# Patient Record
Sex: FEMALE | Race: ASIAN | NOT HISPANIC OR LATINO | Employment: FULL TIME | URBAN - METROPOLITAN AREA
[De-identification: names, ages, dates, MRNs, and addresses within clinical notes are randomized per-mention and may not be internally consistent; named-entity substitution may affect disease eponyms.]

---

## 2017-03-03 ENCOUNTER — OFFICE VISIT (OUTPATIENT)
Dept: FAMILY MEDICINE | Facility: CLINIC | Age: 47
End: 2017-03-03
Payer: COMMERCIAL

## 2017-03-03 VITALS
RESPIRATION RATE: 16 BRPM | WEIGHT: 132 LBS | TEMPERATURE: 98 F | HEIGHT: 60 IN | SYSTOLIC BLOOD PRESSURE: 128 MMHG | DIASTOLIC BLOOD PRESSURE: 82 MMHG | HEART RATE: 76 BPM | BODY MASS INDEX: 25.91 KG/M2

## 2017-03-03 DIAGNOSIS — R07.0 THROAT PAIN: Primary | ICD-10-CM

## 2017-03-03 LAB
DEPRECATED S PYO AG THROAT QL EIA: NORMAL
MICRO REPORT STATUS: NORMAL
SPECIMEN SOURCE: NORMAL

## 2017-03-03 PROCEDURE — 87880 STREP A ASSAY W/OPTIC: CPT | Performed by: PHYSICIAN ASSISTANT

## 2017-03-03 PROCEDURE — 87081 CULTURE SCREEN ONLY: CPT | Performed by: PHYSICIAN ASSISTANT

## 2017-03-03 PROCEDURE — 99213 OFFICE O/P EST LOW 20 MIN: CPT | Performed by: PHYSICIAN ASSISTANT

## 2017-03-03 NOTE — PATIENT INSTRUCTIONS
Self-Care for Sore Throats  Sore throats occur for many reasons, such as colds, allergies, and infections caused by viruses or bacteria. In any case, your throat becomes red and sore. Your goal for self-care is to reduce your discomfort while giving your throat a chance to heal.    Moisten and Soothe Your Throat    Try a sip of water first thing after waking up.    Keep your throat moist by drinking 6 or more glasses of clear liquids every day.    Run a cool-air humidifier in your room overnight.    Avoid cigarette smoke.     Suck on throat lozenges, cough drops, hard candy, ice chips, or frozen fruit-juice bars. Use the sugar-free versions if your diet or medical condition require them.  Gargle to Ease Irritation  Gargling every hour or 2 can ease irritation. Try gargling with 1 of these solutions:    1/4 teaspoon of salt in 1/2 cup of warm water    An over-the-counter anesthetic gargle  Use Medication for More Relief  Over-the-counter medication can reduce sore throat symptoms. Ask your pharmacist if you have questions about which medication to use:    Ease pain with anesthetic sprays. Aspirin or an aspirin substitute also helps. Remember, never give aspirin to anyone 18 or younger, or if you are already taking blood thinners.     For sore throats caused by allergies, try antihistamines to block the allergic reaction.    Remember: unless a sore throat is caused by a bacterial infection, antibiotics won t help you.  Prevent Future Sore Throats    Stop smoking or reduce contact with secondhand smoke. Smoke irritates the tender throat lining.    Limit contact with pets and with allergy-causing substances, such as pollen and mold.    When you re around someone with a sore throat or cold, wash your hands frequently to keep viruses or bacteria from spreading.    Don t strain your vocal cords.  Call Your Health Care Provider  Contact your doctor if you have:    A temperature over 101 F (38.3 C)    White spots on the  throat    Great difficulty swallowing    Trouble breathing    A skin rash    Recent exposure to someone else with strep bacteria    Severe hoarseness and swollen glands in the neck or jaw     4841-0695 [employer]. [address] [place] [address], [place] [address]. All rights reserved. This information is not intended as a substitute for professional medical care. Always follow your healthcare professional's instructions.

## 2017-03-03 NOTE — PROGRESS NOTES
SUBJECTIVE:                                                    Chad Smith is a 46 year old female who presents to clinic today for the following health issues:      Acute Illness   Acute illness concerns: sore throat  Onset: Monday pm    Fever: did not take    Chills/Sweats: YES    Headache (location?): YES    Sinus Pressure:YES    Conjunctivitis:  no    Ear Pain: YES: both    Rhinorrhea: YES    Congestion: YES    Sore Throat: YES     Cough: YES-productive of yellow sputum    Wheeze: no     Decreased Appetite: YES    Nausea: no     Vomiting: no    Diarrhea:  no    Dysuria/Freq.: no    Fatigue/Achiness: YES    Sick/Strep Exposure: son     Therapies Tried and outcome: tylenol, sudafed      Problem list and histories reviewed & adjusted, as indicated.  Additional history: as documented      Reviewed and updated as needed this visit by clinical staff  Tobacco  Allergies  Meds  Problems  Med Hx  Surg Hx  Fam Hx  Soc Hx        Reviewed and updated as needed this visit by Provider  Allergies  Meds  Problems         ROS:  Constitutional, HEENT, cardiovascular, pulmonary, gi and gu systems are negative, except as otherwise noted.    OBJECTIVE:                                                    /82 (BP Location: Right arm, Patient Position: Chair, Cuff Size: Adult Regular)  Pulse 76  Temp 98  F (36.7  C) (Oral)  Resp 16  Ht 5' (1.524 m)  Wt 132 lb (59.9 kg)  BMI 25.78 kg/m2  Body mass index is 25.78 kg/(m^2).  GENERAL: alert and no distress  EYES: Eyes grossly normal to inspection, PERRL and conjunctivae and sclerae normal  HENT: normal cephalic/atraumatic, both ears: clear effusion, nasal mucosa edematous , oral mucous membranes moist, oropharynx with mild erythema and injection, no tonsillar hypertrophy, no tonsillar exudate and sinuses: frontal tenderness on bilaterally  NECK: no adenopathy, no asymmetry, masses, or scars and thyroid normal to palpation  RESP: lungs clear to auscultation - no  rales, rhonchi or wheezes  CV: regular rate and rhythm, normal S1 S2, no S3 or S4, no murmur, click or rub  ABDOMEN: soft, nontender, no hepatosplenomegaly, no masses and bowel sounds normal  MS: no gross musculoskeletal defects noted, no edema    Diagnostic Test Results:  Results for orders placed or performed in visit on 03/03/17 (from the past 24 hour(s))   Rapid strep screen   Result Value Ref Range    Specimen Description Throat     Rapid Strep A Screen       NEGATIVE: No Group A streptococcal antigen detected by immunoassay, await   culture report.      Micro Report Status FINAL 03/03/2017         ASSESSMENT/PLAN:                                                      (R07.0) Throat pain  (primary encounter diagnosis)  Comment: rapid strep negative. Likely viral. Supportive care measures discussed. See patient instructions. If symptoms fail to improve in 1 week, patient should RTC. Sooner if worsening.   Plan: Rapid strep screen, Beta strep group A culture              Follow up: as above     Richard Lowe PA-C  [facility]

## 2017-03-03 NOTE — MR AVS SNAPSHOT
After Visit Summary   3/3/2017    Chad Smith    MRN: [id number]           Patient Information     Date Of Birth          1970        Visit Information        Provider Department      3/3/2017 9:45 AM Richard Lowe PA-C [facility]        Today's Diagnoses     Throat pain    -  1      Care Instructions      Self-Care for Sore Throats  Sore throats occur for many reasons, such as colds, allergies, and infections caused by viruses or bacteria. In any case, your throat becomes red and sore. Your goal for self-care is to reduce your discomfort while giving your throat a chance to heal.    Moisten and Soothe Your Throat    Try a sip of water first thing after waking up.    Keep your throat moist by drinking 6 or more glasses of clear liquids every day.    Run a cool-air humidifier in your room overnight.    Avoid cigarette smoke.     Suck on throat lozenges, cough drops, hard candy, ice chips, or frozen fruit-juice bars. Use the sugar-free versions if your diet or medical condition require them.  Gargle to Ease Irritation  Gargling every hour or 2 can ease irritation. Try gargling with 1 of these solutions:    1/4 teaspoon of salt in 1/2 cup of warm water    An over-the-counter anesthetic gargle  Use Medication for More Relief  Over-the-counter medication can reduce sore throat symptoms. Ask your pharmacist if you have questions about which medication to use:    Ease pain with anesthetic sprays. Aspirin or an aspirin substitute also helps. Remember, never give aspirin to anyone 18 or younger, or if you are already taking blood thinners.     For sore throats caused by allergies, try antihistamines to block the allergic reaction.    Remember: unless a sore throat is caused by a bacterial infection, antibiotics won t help you.  Prevent Future Sore Throats    Stop smoking or reduce contact with secondhand smoke. Smoke irritates the tender throat lining.    Limit  contact with pets and with allergy-causing substances, such as pollen and mold.    When you re around someone with a sore throat or cold, wash your hands frequently to keep viruses or bacteria from spreading.    Don t strain your vocal cords.  Call Your Health Care Provider  Contact your doctor if you have:    A temperature over 101 F (38.3 C)    White spots on the throat    Great difficulty swallowing    Trouble breathing    A skin rash    Recent exposure to someone else with strep bacteria    Severe hoarseness and swollen glands in the neck or jaw     2619-6936 [employer]. [address] [place] [address]. All rights reserved. This information is not intended as a substitute for professional medical care. Always follow your healthcare professional's instructions.              Follow-ups after your visit        Who to contact     If you have questions or need follow up information about today's clinic visit or your schedule please contact [facility] directly at [phone].  Normal or non-critical lab and imaging results will be communicated to you by [employer]TAJhart, letter or phone within 4 business days after the clinic has received the results. If you do not hear from us within 7 days, please contact the clinic through [employer]TAJhart or phone. If you have a critical or abnormal lab result, we will notify you by phone as soon as possible.  Submit refill requests through [employer] or call your pharmacy and they will forward the refill request to us. Please allow 3 business days for your refill to be completed.          Additional Information About Your Visit        St[employer]um Information     [employer] gives you secure access to your electronic health record. If you see a primary care provider, you can also send messages to your care team and make appointments. If you have questions, please call your primary care clinic.  If you do not have a primary care provider, please call [phone]  and they will assist you.        Care EveryWhere ID     This is your Care EveryWhere ID. This could be used by other organizations to access your [facility] medical records  [id number]        Your Vitals Were     Pulse Temperature Respirations Height BMI (Body Mass Index)       76 98  F (36.7  C) (Oral) 16 5' (1.524 m) 25.78 kg/m2        Blood Pressure from Last 3 Encounters:   03/03/17 128/82   11/04/14 102/70   02/04/14 100/60    Weight from Last 3 Encounters:   03/03/17 132 lb (59.9 kg)   11/04/14 122 lb (55.3 kg)   02/04/14 120 lb (54.4 kg)              We Performed the Following     Rapid strep screen        Primary Care Provider Office Phone # Fax #    Jaskaran Lennox Paul Balgobin, [phone]       [facility] [address] [facility] [address]        Thank you!     Thank you for choosing [facility]  for your care. Our goal is always to provide you with excellent care. Hearing back from our patients is one way we can continue to improve our services. Please take a few minutes to complete the written survey that you may receive in the mail after your visit with us. Thank you!             Your Updated Medication List - Protect others around you: Learn how to safely use, store and throw away your medicines at www.disposemymeds.org.      Notice  As of 3/3/2017 10:13 AM    You have not been prescribed any medications.

## 2017-03-03 NOTE — NURSING NOTE
Chief Complaint   Patient presents with     Pharyngitis     /82 (BP Location: Right arm, Patient Position: Chair, Cuff Size: Adult Regular)  Pulse 76  Temp 98  F (36.7  C) (Oral)  Resp 16  Ht 5' (1.524 m)  Wt 132 lb (59.9 kg)  BMI 25.78 kg/m2 Estimated body mass index is 25.78 kg/(m^2) as calculated from the following:    Height as of this encounter: 5' (1.524 m).    Weight as of this encounter: 132 lb (59.9 kg).  BP completed using cuff size: regular       Health Maintenance addressed:  Pap Smear    Pt will schedule physical/pap appt.    ZIYAD Sands

## 2017-03-05 LAB
BACTERIA SPEC CULT: NORMAL
MICRO REPORT STATUS: NORMAL
SPECIMEN SOURCE: NORMAL

## 2017-03-27 ENCOUNTER — OFFICE VISIT (OUTPATIENT)
Dept: FAMILY MEDICINE | Facility: CLINIC | Age: 47
End: 2017-03-27
Payer: COMMERCIAL

## 2017-03-27 VITALS
HEART RATE: 92 BPM | WEIGHT: 134 LBS | RESPIRATION RATE: 20 BRPM | BODY MASS INDEX: 26.17 KG/M2 | DIASTOLIC BLOOD PRESSURE: 78 MMHG | OXYGEN SATURATION: 97 % | TEMPERATURE: 97.3 F | SYSTOLIC BLOOD PRESSURE: 113 MMHG

## 2017-03-27 DIAGNOSIS — J20.9 ACUTE BRONCHITIS, UNSPECIFIED ORGANISM: Primary | ICD-10-CM

## 2017-03-27 PROCEDURE — 99213 OFFICE O/P EST LOW 20 MIN: CPT | Performed by: FAMILY MEDICINE

## 2017-03-27 RX ORDER — PREDNISONE 20 MG/1
20 TABLET ORAL DAILY
Qty: 5 TABLET | Refills: 0 | Status: SHIPPED | OUTPATIENT
Start: 2017-03-27 | End: 2017-04-11

## 2017-03-27 RX ORDER — BENZONATATE 100 MG/1
100 CAPSULE ORAL 3 TIMES DAILY PRN
Qty: 42 CAPSULE | Refills: 0 | Status: SHIPPED | OUTPATIENT
Start: 2017-03-27 | End: 2017-04-11

## 2017-03-27 NOTE — NURSING NOTE
Chief Complaint   Patient presents with     URI     uri symptoms x4 weeks, c/o cough and chest congestion, nasal congestion       Initial /78 (BP Location: Right arm, Patient Position: Chair, Cuff Size: Adult Regular)  Pulse 92  Temp 97.3  F (36.3  C) (Oral)  Resp 20  Wt 134 lb (60.8 kg)  SpO2 97%  Breastfeeding? No  BMI 26.17 kg/m2 Estimated body mass index is 26.17 kg/(m^2) as calculated from the following:    Height as of 3/3/17: 5' (1.524 m).    Weight as of this encounter: 134 lb (60.8 kg).  Medication Reconciliation: complete     Octavia Toussaint/[facility]

## 2017-03-27 NOTE — PROGRESS NOTES
SUBJECTIVE:                                                    Chad Smith is a 46 year old female who presents to clinic today for the following health issues:      Acute Illness   Acute illness concerns: cough and chest congestion  Onset: x4 weeks    Fever: YES    Chills/Sweats: YES    Headache (location?): YES    Sinus Pressure:YES    Conjunctivitis:  YES-     Ear Pain: YES: bilateral    Rhinorrhea: YES    Congestion: YES    Sore Throat: YES    PND: YES     Cough: YES    Wheeze: YES    Shortness of breath: no    Decreased Appetite: YES    Nausea: no    Vomiting: no    Diarrhea:  no    Dysuria/Freq.: no    Fatigue/Achiness: YES    Sick/Strep Exposure: YES     Therapies Tried and outcome: ibuprofen , OTC cough meds - no relief         Problem list and histories reviewed & adjusted, as indicated.  Additional history: as documented    Patient Active Problem List   Diagnosis     Encounter for other general counseling or advice on contraception     CARDIOVASCULAR SCREENING; LDL GOAL LESS THAN 160     Common wart     Past Surgical History:   Procedure Laterality Date     C  DELIVERY ONLY      x2       Social History   Substance Use Topics     Smoking status: Never Smoker     Smokeless tobacco: Never Used     Alcohol use No     Family History   Problem Relation Age of Onset     Hypertension Father      Genitourinary Problems Father      kidney disease     CEREBROVASCULAR DISEASE Father      Respiratory Father      Sleep apnea     DIABETES Maternal Grandfather      type 2           Reviewed and updated as needed this visit by clinical staff  Tobacco  Allergies       Reviewed and updated as needed this visit by Provider         ROS:  Constitutional, HEENT, pulmonary systems are negative, except as otherwise noted.    OBJECTIVE:                                                    /78 (BP Location: Right arm, Patient Position: Chair, Cuff Size: Adult Regular)  Pulse 92  Temp 97.3  F (36.3  C)  (Oral)  Resp 20  Wt 134 lb (60.8 kg)  SpO2 97%  Breastfeeding? No  BMI 26.17 kg/m2  Body mass index is 26.17 kg/(m^2).  GENERAL: healthy, alert and no distress  HENT: ear canals and TM's normal, nose and mouth without ulcers or lesions  NECK: no adenopathy, no asymmetry, masses, or scars and thyroid normal to palpation  RESP: significant cough during exam, mild expiratory wheezing, no acute respiratory distress noted.   CV: regular rate and rhythm, normal S1 S2, no S3 or S4, no murmur,   Diagnostic Test Results:  none      ASSESSMENT/PLAN:                                                        1. Acute bronchitis, unspecified organism  - Discussed etiology and natural course of bronchitis and that most do not improve with antibiotics. Symptomatic management for bronchitis -acetaminophen or ibuprofen for pain or fever, tessalon perles for cough. Return if worsening or develops fever.  - benzonatate (TESSALON) 100 MG capsule; Take 1 capsule (100 mg) by mouth 3 times daily as needed  Dispense: 42 capsule; Refill: 0  - predniSONE (DELTASONE) 20 MG tablet; Take 1 tablet (20 mg) by mouth daily  Dispense: 5 tablet; Refill: 0    See Patient Instructions    Priscilla Shetty MD  [facility]

## 2017-03-27 NOTE — PATIENT INSTRUCTIONS
Flonase use daily  Humidifier   Take prednisone in the mornings x 5 days   Tessalon perles up to 3 times a day as needed for cough   Follow up as needed  Bronchitis, Viral (Adult)    You have a viral bronchitis. Bronchitis is inflammation and swelling of the lining of the lungs. This is often caused by an infection. Symptoms include a dry, hacking cough that is worse at night. The cough may bring up yellow-green mucus. You may also feel short of breath or wheeze. Other symptoms may include tiredness, chest discomfort, and chills.  Bronchitis that is caused by a virus is not treated with antibiotics. Instead, medicines may be given to help relieve symptoms. Symptoms can last up to 2 weeks, although the cough may last much longer.  This illness is contagious during the first few days and is spread through the air by coughing and sneezing, or by direct contact (touching the sick person and then touching your own eyes, nose, or mouth).  Most viral illnesses resolve within 10 to 14 days with rest and simple home remedies, although they may sometimes last for several weeks.  Home care    If symptoms are severe, rest at home for the first 2 to 3 days. When you go back to your usual activities, don't let yourself get too tired.    Do not smoke. Also avoid being exposed to secondhand smoke.    You may use over-the-counter medicine to control fever or pain, unless another pain medicine was prescribed. (Note: If you have chronic liver or kidney disease or have ever had a stomach ulcer or gastrointestinal bleeding, talk with your healthcare provider before using these medicines. Also talk to your provider if you are taking medicine to prevent blood clots.) Aspirin should never be given to anyone younger than 18 years of age who is ill with a viral infection or fever. It may cause severe liver or brain damage.    Your appetite may be poor, so a light diet is fine. Avoid dehydration by drinking 6 to 8 glasses of fluids per day  (such as water, soft drinks, sports drinks, juices, tea, or soup). Extra fluids will help loosen secretions in the nose and lungs.    Over-the-counter cough, cold, and sore-throat medicines will not shorten the length of the illness, but they may help to reduce symptoms. (Note: Do not use decongestants if you have high blood pressure.)  Follow-up care  Follow up with your healthcare provider, or as advised.  If you had an X-ray or ECG (electrocardiogram), a specialist will review it. You will be notified of any new findings that may affect your care.  Note: If you are age 65 or older, or if you have a chronic lung disease or condition that affects your immune system, or you smoke, talk to your healthcare provider about having pneumococcal vaccinations and a yearly influenza vaccination (flu shot).  When to seek medical advice   Call your healthcare provider right away if any of these occur:    Fever of 100.4 F (38 C) or higher    Coughing up increased amounts of colored sputum    Weakness, drowsiness, headache, facial pain, ear pain, or a stiff neck  Call 911, or get immediate medical care  Contact emergency services right away if any of these occur:    Coughing up blood    Worsening weakness, drowsiness, headache, or stiff neck    Trouble breathing, wheezing, or pain with breathing    9251-5173 [employer]. [address] [place] [address], [place] [address]. All rights reserved. This information is not intended as a substitute for professional medical care. Always follow your healthcare professional's instructions.

## 2017-03-27 NOTE — MR AVS SNAPSHOT
After Visit Summary   3/27/2017    Chad Smith    MRN: [id number]           Patient Information     Date Of Birth          1970        Visit Information        Provider Department      3/27/2017 3:00 PM Priscilla Shetty MD [facility]        Today's Diagnoses     Acute bronchitis, unspecified organism    -  1      Care Instructions      Flonase use daily  Humidifier   Take prednisone in the mornings x 5 days   Tessalon perles up to 3 times a day as needed for cough   Follow up as needed  Bronchitis, Viral (Adult)    You have a viral bronchitis. Bronchitis is inflammation and swelling of the lining of the lungs. This is often caused by an infection. Symptoms include a dry, hacking cough that is worse at night. The cough may bring up yellow-green mucus. You may also feel short of breath or wheeze. Other symptoms may include tiredness, chest discomfort, and chills.  Bronchitis that is caused by a virus is not treated with antibiotics. Instead, medicines may be given to help relieve symptoms. Symptoms can last up to 2 weeks, although the cough may last much longer.  This illness is contagious during the first few days and is spread through the air by coughing and sneezing, or by direct contact (touching the sick person and then touching your own eyes, nose, or mouth).  Most viral illnesses resolve within 10 to 14 days with rest and simple home remedies, although they may sometimes last for several weeks.  Home care    If symptoms are severe, rest at home for the first 2 to 3 days. When you go back to your usual activities, don't let yourself get too tired.    Do not smoke. Also avoid being exposed to secondhand smoke.    You may use over-the-counter medicine to control fever or pain, unless another pain medicine was prescribed. (Note: If you have chronic liver or kidney disease or have ever had a stomach ulcer or gastrointestinal bleeding, talk with your  healthcare provider before using these medicines. Also talk to your provider if you are taking medicine to prevent blood clots.) Aspirin should never be given to anyone younger than 18 years of age who is ill with a viral infection or fever. It may cause severe liver or brain damage.    Your appetite may be poor, so a light diet is fine. Avoid dehydration by drinking 6 to 8 glasses of fluids per day (such as water, soft drinks, sports drinks, juices, tea, or soup). Extra fluids will help loosen secretions in the nose and lungs.    Over-the-counter cough, cold, and sore-throat medicines will not shorten the length of the illness, but they may help to reduce symptoms. (Note: Do not use decongestants if you have high blood pressure.)  Follow-up care  Follow up with your healthcare provider, or as advised.  If you had an X-ray or ECG (electrocardiogram), a specialist will review it. You will be notified of any new findings that may affect your care.  Note: If you are age 65 or older, or if you have a chronic lung disease or condition that affects your immune system, or you smoke, talk to your healthcare provider about having pneumococcal vaccinations and a yearly influenza vaccination (flu shot).  When to seek medical advice   Call your healthcare provider right away if any of these occur:    Fever of 100.4 F (38 C) or higher    Coughing up increased amounts of colored sputum    Weakness, drowsiness, headache, facial pain, ear pain, or a stiff neck  Call 911, or get immediate medical care  Contact emergency services right away if any of these occur:    Coughing up blood    Worsening weakness, drowsiness, headache, or stiff neck    Trouble breathing, wheezing, or pain with breathing    [id number] [employer]. [address] [place] [address]. All rights reserved. This information is not intended as a substitute for professional medical care. Always follow your healthcare professional's  instructions.              Follow-ups after your visit        Who to contact     If you have questions or need follow up information about today's clinic visit or your schedule please contact [facility] directly at [phone].  Normal or non-critical lab and imaging results will be communicated to you by MyChart, letter or phone within 4 business days after the clinic has received the results. If you do not hear from us within 7 days, please contact the clinic through [employer] or phone. If you have a critical or abnormal lab result, we will notify you by phone as soon as possible.  Submit refill requests through [employer] or call your pharmacy and they will forward the refill request to us. Please allow 3 business days for your refill to be completed.          Additional Information About Your Visit        [employer] Information     [employer] gives you secure access to your electronic health record. If you see a primary care provider, you can also send messages to your care team and make appointments. If you have questions, please call your primary care clinic.  If you do not have a primary care provider, please call [phone] and they will assist you.        Care EveryWhere ID     This is your Care EveryWhere ID. This could be used by other organizations to access your [facility] medical records  [id number]        Your Vitals Were     Pulse Temperature Respirations Pulse Oximetry Breastfeeding? BMI (Body Mass Index)    92 97.3  F (36.3  C) (Oral) 20 97% No 26.17 kg/m2       Blood Pressure from Last 3 Encounters:   03/27/17 113/78   03/03/17 128/82   11/04/14 102/70    Weight from Last 3 Encounters:   03/27/17 134 lb (60.8 kg)   03/03/17 132 lb (59.9 kg)   11/04/14 122 lb (55.3 kg)              Today, you had the following     No orders found for display         Today's Medication Changes          These changes are accurate as of: 3/27/17  3:27 PM.  If you have any questions, ask your nurse or  doctor.               Start taking these medicines.        Dose/Directions    benzonatate 100 MG capsule   Commonly known as:  TESSALON   Used for:  Acute bronchitis, unspecified organism   Started by:  Priscilla Shetty MD        Dose:  100 mg   Take 1 capsule (100 mg) by mouth 3 times daily as needed   Quantity:  42 capsule   Refills:  0       predniSONE 20 MG tablet   Commonly known as:  DELTASONE   Used for:  Acute bronchitis, unspecified organism   Started by:  Priscilla Shetty MD        Dose:  20 mg   Take 1 tablet (20 mg) by mouth daily   Quantity:  5 tablet   Refills:  0            Where to get your medicines      These medications were sent to [facility] [address]  [address] [facility] [address]     Phone:  [phone]     benzonatate 100 MG capsule    predniSONE 20 MG tablet                Primary Care Provider Office Phone # Fax #    Jaskaran Lennox Paul Balgobin, [phone]       [facility] [address] [facility] [address]        Thank you!     Thank you for choosing [facility]  for your care. Our goal is always to provide you with excellent care. Hearing back from our patients is one way we can continue to improve our services. Please take a few minutes to complete the written survey that you may receive in the mail after your visit with us. Thank you!             Your Updated Medication List - Protect others around you: Learn how to safely use, store and throw away your medicines at www.disposemymeds.org.          This list is accurate as of: 3/27/17  3:27 PM.  Always use your most recent med list.                   Brand Name Dispense Instructions for use    benzonatate 100 MG capsule    TESSALON    42 capsule    Take 1 capsule (100 mg) by mouth 3 times daily as needed       predniSONE 20 MG tablet    DELTASONE    5 tablet    Take 1 tablet (20 mg) by  mouth daily

## 2017-04-06 ENCOUNTER — TELEPHONE (OUTPATIENT)
Dept: FAMILY MEDICINE | Facility: CLINIC | Age: 47
End: 2017-04-06

## 2017-04-06 NOTE — TELEPHONE ENCOUNTER
Panel Management Review      Patient has the following on her problem list: None      Composite cancer screening  Chart review shows that this patient is due/due soon for the following Pap Smear  Summary:    Patient is due/failing the following:   PAP    Action needed:   Patient needs office visit for physical wit hpap.    Type of outreach:    Phone, left message for patient to call back.     Questions for provider review:    None                                                                                                                                    ZIYAD Sansd      Chart routed to Care Team .

## 2017-04-11 ENCOUNTER — OFFICE VISIT (OUTPATIENT)
Dept: FAMILY MEDICINE | Facility: CLINIC | Age: 47
End: 2017-04-11
Payer: COMMERCIAL

## 2017-04-11 VITALS
HEART RATE: 68 BPM | DIASTOLIC BLOOD PRESSURE: 80 MMHG | HEIGHT: 60 IN | BODY MASS INDEX: 26.31 KG/M2 | WEIGHT: 134 LBS | OXYGEN SATURATION: 96 % | TEMPERATURE: 98.1 F | SYSTOLIC BLOOD PRESSURE: 112 MMHG | RESPIRATION RATE: 10 BRPM

## 2017-04-11 DIAGNOSIS — Z00.00 ROUTINE GENERAL MEDICAL EXAMINATION AT A HEALTH CARE FACILITY: Primary | ICD-10-CM

## 2017-04-11 DIAGNOSIS — Z97.5 IUD (INTRAUTERINE DEVICE) IN PLACE: ICD-10-CM

## 2017-04-11 LAB
BASOPHILS # BLD AUTO: 0 10E9/L (ref 0–0.2)
BASOPHILS NFR BLD AUTO: 0.3 %
DIFFERENTIAL METHOD BLD: ABNORMAL
EOSINOPHIL # BLD AUTO: 0.2 10E9/L (ref 0–0.7)
EOSINOPHIL NFR BLD AUTO: 1.5 %
ERYTHROCYTE [DISTWIDTH] IN BLOOD BY AUTOMATED COUNT: 12.5 % (ref 10–15)
HCT VFR BLD AUTO: 42.4 % (ref 35–47)
HGB BLD-MCNC: 14.1 G/DL (ref 11.7–15.7)
LYMPHOCYTES # BLD AUTO: 2.8 10E9/L (ref 0.8–5.3)
LYMPHOCYTES NFR BLD AUTO: 21.8 %
MCH RBC QN AUTO: 30.5 PG (ref 26.5–33)
MCHC RBC AUTO-ENTMCNC: 33.3 G/DL (ref 31.5–36.5)
MCV RBC AUTO: 92 FL (ref 78–100)
MONOCYTES # BLD AUTO: 1.3 10E9/L (ref 0–1.3)
MONOCYTES NFR BLD AUTO: 10.1 %
NEUTROPHILS # BLD AUTO: 8.4 10E9/L (ref 1.6–8.3)
NEUTROPHILS NFR BLD AUTO: 66.3 %
PLATELET # BLD AUTO: 287 10E9/L (ref 150–450)
RBC # BLD AUTO: 4.63 10E12/L (ref 3.8–5.2)
WBC # BLD AUTO: 12.7 10E9/L (ref 4–11)

## 2017-04-11 PROCEDURE — 87624 HPV HI-RISK TYP POOLED RSLT: CPT | Performed by: NURSE PRACTITIONER

## 2017-04-11 PROCEDURE — G0145 SCR C/V CYTO,THINLAYER,RESCR: HCPCS | Performed by: NURSE PRACTITIONER

## 2017-04-11 PROCEDURE — 36415 COLL VENOUS BLD VENIPUNCTURE: CPT | Performed by: NURSE PRACTITIONER

## 2017-04-11 PROCEDURE — 80053 COMPREHEN METABOLIC PANEL: CPT | Performed by: NURSE PRACTITIONER

## 2017-04-11 PROCEDURE — 80061 LIPID PANEL: CPT | Performed by: NURSE PRACTITIONER

## 2017-04-11 PROCEDURE — 99396 PREV VISIT EST AGE 40-64: CPT | Performed by: NURSE PRACTITIONER

## 2017-04-11 PROCEDURE — 85025 COMPLETE CBC W/AUTO DIFF WBC: CPT | Performed by: NURSE PRACTITIONER

## 2017-04-11 NOTE — MR AVS SNAPSHOT
After Visit Summary   4/11/2017    Chad Smith    MRN: [id number]           Patient Information     Date Of Birth          1970        Visit Information        Provider Department      4/11/2017 1:00 PM Haase, Susan Rachele, APRN [facility]        Today's Diagnoses     Routine general medical examination at a health care facility    -  1      Care Instructions      Preventive Health Recommendations  Female Ages 40 to 49    Yearly exam:     See your health care provider every year in order to  1. Review health changes.   2. Discuss preventive care.    3. Review your medicines if your doctor prescribed any.      Get a Pap test every three years (unless you have an abnormal result and your provider advises testing more often).      If you get Pap tests with HPV test, you only need to test every 5 years, unless you have an abnormal result. You do not need a Pap test if your uterus was removed (hysterectomy) and you have not had cancer.      You should be tested each year for STDs (sexually transmitted diseases), if you're at risk.       Ask your doctor if you should have a mammogram.      Have a colonoscopy (test for colon cancer) if someone in your family has had colon cancer or polyps before age 50.       Have a cholesterol test every 5 years.       Have a diabetes test (fasting glucose) after age 45. If you are at risk for diabetes, you should have this test every 3 years.    Shots: Get a flu shot each year. Get a tetanus shot every 10 years.     Nutrition:     Eat at least 5 servings of fruits and vegetables each day.    Eat whole-grain bread, whole-wheat pasta and brown rice instead of white grains and rice.    Talk to your provider about Calcium and Vitamin D.     Lifestyle    Exercise at least 150 minutes a week (an average of 30 minutes a day, 5 days a week). This will help you control your weight and prevent disease.    Limit alcohol to one drink per  day.    No smoking.     Wear sunscreen to prevent skin cancer.    See your dentist every six months for an exam and cleaning.        Follow-ups after your visit        Follow-up notes from your care team     Return in about 1 year (around 4/11/2018) for Physical Exam.      Who to contact     If you have questions or need follow up information about today's clinic visit or your schedule please contact [facility] directly at [phone].  Normal or non-critical lab and imaging results will be communicated to you by [employer], letter or phone within 4 business days after the clinic has received the results. If you do not hear from us within 7 days, please contact the clinic through [employer] or phone. If you have a critical or abnormal lab result, we will notify you by phone as soon as possible.  Submit refill requests through [employer] or call your pharmacy and they will forward the refill request to us. Please allow 3 business days for your refill to be completed.          Additional Information About Your Visit        [employer] Information     [employer] gives you secure access to your electronic health record. If you see a primary care provider, you can also send messages to your care team and make appointments. If you have questions, please call your primary care clinic.  If you do not have a primary care provider, please call [phone] and they will assist you.        Care EveryWhere ID     This is your Care EveryWhere ID. This could be used by other organizations to access your [facility] medical records  [id number]        Your Vitals Were     Pulse Temperature Respirations Height Pulse Oximetry BMI (Body Mass Index)    68 98.1  F (36.7  C) (Oral) 10 5' (1.524 m) 96% 26.17 kg/m2       Blood Pressure from Last 3 Encounters:   04/11/17 112/80   03/27/17 113/78   03/03/17 128/82    Weight from Last 3 Encounters:   04/11/17 134 lb (60.8 kg)   03/27/17 134 lb (60.8 kg)   03/03/17 132 lb (59.9 kg)               We Performed the Following     CBC with platelets differential     Comprehensive metabolic panel     HPV High Risk Types DNA Cervical     Lipid panel reflex to direct LDL     Pap imaged thin layer screen with HPV - recommended age 30 - 65 years (select HPV order below)        Primary Care Provider    None Specified       No primary provider on file.        Thank you!     Thank you for choosing [facility]  for your care. Our goal is always to provide you with excellent care. Hearing back from our patients is one way we can continue to improve our services. Please take a few minutes to complete the written survey that you may receive in the mail after your visit with us. Thank you!             Your Updated Medication List - Protect others around you: Learn how to safely use, store and throw away your medicines at www.disposemymeds.org.      Notice  As of 4/11/2017  1:30 PM    You have not been prescribed any medications.

## 2017-04-11 NOTE — PROGRESS NOTES
SUBJECTIVE:     CC: Chad Smith is an 46 year old woman who presents for preventive health visit.     Healthy Habits:    Do you get at least three servings of calcium containing foods daily (dairy, green leafy vegetables, etc.)? yes    Amount of exercise or daily activities, outside of work: 3-4 day(s) per week    Problems taking medications regularly No    Medication side effects: No    Have you had an eye exam in the past two years? yes    Do you see a dentist twice per year? Yes    Do you have sleep apnea, excessive snoring or daytime drowsiness?no    Health Maintenance -- Seen on 3/27 for bronchitis. Feeling better, cough almost completely resolved.   Menses:  Has Mirena IUD. No periods since IUD placed in 12/2013. Normally exercising 3-4 days/week. Running on treadmill. Has not been to the gym since sick but starting to resume exercise now that feeling better. Denies abdominal pain, constipation, diarrhea, or vaginal discharge. No FHx of breast cancer.      Mammogram -- Not appropriate based on age, baseline screening completed in 2014, normal    Pap Smear -- last 5/2013, recommended every 3 years - OVERDUE - will complete today   Colonoscopy -- Not appropriate based on age    Today's PHQ-2 Score:   PHQ-2 ( 1999 [employer]) 4/11/2017 5/21/2013   Q1: Little interest or pleasure in doing things 0 0   Q2: Feeling down, depressed or hopeless 0 0   PHQ-2 Score 0 0     Abuse: Current or Past(Physical, Sexual or Emotional)- No  Do you feel safe in your environment - Yes    Social History   Substance Use Topics     Smoking status: Never Smoker     Smokeless tobacco: Never Used     Alcohol use No     The patient does not drink >3 drinks per day nor >7 drinks per week.    Recent Labs   Lab Test  05/21/13   1126   CHOL  154   HDL  45*   LDL  96   TRIG  64   CHOLHDLRATIO  3.4     Reviewed orders with patient.  Reviewed health maintenance and updated orders accordingly - Yes    Mammo Decision Support:  Mammogram  not appropriate for this patient based on age. Screening completed in 2014, normal.    Pertinent mammograms are reviewed under the imaging tab.  History of abnormal Pap smear: NO - age 30- 65 PAP every 3 years recommended    Reviewed and updated as needed this visit by clinical staff  Tobacco  Meds  Med Hx  Surg Hx  Fam Hx  Soc Hx      Reviewed and updated as needed this visit by Provider    Past Medical History:   Diagnosis Date     IUD (intrauterine device) in place 12/13     NO ACTIVE PROBLEMS       ROS:  Constitutional, HEENT, cardiovascular, pulmonary, GI, , musculoskeletal, neuro, skin, endocrine and psych systems are negative, except as in HPI or otherwise noted     This document serves as a record of the services and decisions personally performed and made by Susan Haase, CNP. It was created on her behalf by Neyda Arreguin, a trained medical scribe. The creation of this document is based the provider's statements to the medical scribe.  Neyda Arreguin April 11, 2017 1:15 PM     Problem list, Medication list, Allergies, and Medical/Social/Surgical histories reviewed in [facility] and updated as appropriate.  OBJECTIVE:     /80 (BP Location: Left arm, Patient Position: Chair, Cuff Size: Adult Regular)  Pulse 68  Temp 98.1  F (36.7  C) (Oral)  Resp 10  Ht 1.524 m (5')  Wt 60.8 kg (134 lb)  SpO2 96%  BMI 26.17 kg/m2  EXAM:  GENERAL: healthy, alert and no distress  HENT: ear canals and TM's normal, nose and mouth without ulcers or lesions  NECK: no adenopathy, no asymmetry, masses, or scars and thyroid normal to palpation  RESP: lungs clear to auscultation - no rales, rhonchi or wheezes  BREAST: normal without masses, tenderness or nipple discharge and no palpable axillary masses or adenopathy  CV: regular rate and rhythm, normal S1 S2, no S3 or S4, no murmur, click or rub, no peripheral edema  ABDOMEN: soft, nontender, no hepatosplenomegaly, no masses and bowel sounds normal   (female):  normal female external genitalia, normal urethral meatus, vaginal mucosa pink, moist, well rugated, and normal cervix/adnexa/uterus without masses or discharge, IUD strings visible in center of cervix  MS: no gross musculoskeletal defects noted, no edema  SKIN: no suspicious lesions or rashes  NEURO: Normal strength and tone, mentation intact and speech normal  PSYCH: mentation appears normal, affect normal/bright    ASSESSMENT/PLAN:     Chad was seen today for physical.    Diagnoses and all orders for this visit:    Routine general medical examination at a health care facility  -     CBC with platelets differential  -     Comprehensive metabolic panel  -     Lipid panel reflex to direct LDL  -     Pap imaged thin layer screen with HPV - recommended age 30 - 65 years (select HPV order below)  -     HPV High Risk Types DNA Cervical    Other orders  -     Cancel: GLUCOSE          COUNSELING:   Reviewed preventive health counseling, as reflected in patient instructions     reports that she has never smoked. She has never used smokeless tobacco.    Estimated body mass index is 26.17 kg/(m^2) as calculated from the following:    Height as of this encounter: 1.524 m (5').    Weight as of this encounter: 60.8 kg (134 lb).     Counseling Resources:  ATP IV Guidelines  Pooled Cohorts Equation Calculator  Breast Cancer Risk Calculator  FRAX Risk Assessment  ICSI Preventive Guidelines  Dietary Guidelines for Americans, 2010  USDA's MyPlate  ASA Prophylaxis  Lung CA Screening    The information in this document, created by the medical scribe for me, accurately reflects the services I personally performed and the decisions made by me. I have reviewed and approved this document for accuracy.   Susan Haase, CNP   Follow up in 1 year, sooner as needed.  Susan Haase, APRN CNP  [facility]

## 2017-04-11 NOTE — NURSING NOTE
Chief Complaint   Patient presents with     Physical       Initial /80 (BP Location: Left arm, Patient Position: Chair, Cuff Size: Adult Regular)  Pulse 68  Temp 98.1  F (36.7  C) (Oral)  Resp 10  Ht 5' (1.524 m)  Wt 134 lb (60.8 kg)  SpO2 96%  BMI 26.17 kg/m2 Estimated body mass index is 26.17 kg/(m^2) as calculated from the following:    Height as of this encounter: 5' (1.524 m).    Weight as of this encounter: 134 lb (60.8 kg).  Medication Reconciliation: complete   Samanta Wilson, CMA

## 2017-04-12 LAB
ALBUMIN SERPL-MCNC: 3.6 G/DL (ref 3.4–5)
ALP SERPL-CCNC: 62 U/L (ref 40–150)
ALT SERPL W P-5'-P-CCNC: 32 U/L (ref 0–50)
ANION GAP SERPL CALCULATED.3IONS-SCNC: 8 MMOL/L (ref 3–14)
AST SERPL W P-5'-P-CCNC: 16 U/L (ref 0–45)
BILIRUB SERPL-MCNC: 0.3 MG/DL (ref 0.2–1.3)
BUN SERPL-MCNC: 9 MG/DL (ref 7–30)
CALCIUM SERPL-MCNC: 8.6 MG/DL (ref 8.5–10.1)
CHLORIDE SERPL-SCNC: 108 MMOL/L (ref 94–109)
CHOLEST SERPL-MCNC: 171 MG/DL
CO2 SERPL-SCNC: 26 MMOL/L (ref 20–32)
CREAT SERPL-MCNC: 0.64 MG/DL (ref 0.52–1.04)
GFR SERPL CREATININE-BSD FRML MDRD: NORMAL ML/MIN/1.7M2
GLUCOSE SERPL-MCNC: 88 MG/DL (ref 70–99)
HDLC SERPL-MCNC: 42 MG/DL
LDLC SERPL CALC-MCNC: 99 MG/DL
NONHDLC SERPL-MCNC: 129 MG/DL
POTASSIUM SERPL-SCNC: 4.2 MMOL/L (ref 3.4–5.3)
PROT SERPL-MCNC: 7.5 G/DL (ref 6.8–8.8)
SODIUM SERPL-SCNC: 142 MMOL/L (ref 133–144)
TRIGL SERPL-MCNC: 148 MG/DL

## 2017-04-12 NOTE — PROGRESS NOTES
Vega Bonilla,  Your lab results are as below:  1)  Cholesterol was normal at 171, your LDL (bad cholesterol) and your HDL (good cholesterol) were also within normal range.   2)  Glucose was normal at 88 (normal fasting is <100).    If you have any questions do not hesitate to call the clinic to discuss the results with me further.     Sincerely,    Susan Haase, CNP

## 2017-04-12 NOTE — PROGRESS NOTES
Hi Gregory,  Your white count is slightly elevated at 12.7, this is likely due to your recent infection.    Sincerely,     Suan Haase, CNP

## 2017-04-13 LAB
COPATH REPORT: NORMAL
PAP: NORMAL

## 2017-04-17 LAB
FINAL DIAGNOSIS: NORMAL
HPV HR 12 DNA CVX QL NAA+PROBE: NEGATIVE
HPV16 DNA SPEC QL NAA+PROBE: NEGATIVE
HPV18 DNA SPEC QL NAA+PROBE: NEGATIVE
SPECIMEN DESCRIPTION: NORMAL

## 2018-10-31 ENCOUNTER — OFFICE VISIT (OUTPATIENT)
Dept: FAMILY MEDICINE | Facility: CLINIC | Age: 48
End: 2018-10-31
Payer: COMMERCIAL

## 2018-10-31 VITALS
OXYGEN SATURATION: 95 % | SYSTOLIC BLOOD PRESSURE: 118 MMHG | DIASTOLIC BLOOD PRESSURE: 78 MMHG | HEIGHT: 61 IN | HEART RATE: 67 BPM | BODY MASS INDEX: 26.28 KG/M2 | WEIGHT: 139.2 LBS | TEMPERATURE: 98.6 F

## 2018-10-31 DIAGNOSIS — Z23 NEED FOR PROPHYLACTIC VACCINATION AND INOCULATION AGAINST INFLUENZA: ICD-10-CM

## 2018-10-31 DIAGNOSIS — Z30.433 ENCOUNTER FOR IUD REMOVAL AND REINSERTION: Primary | ICD-10-CM

## 2018-10-31 PROCEDURE — 90686 IIV4 VACC NO PRSV 0.5 ML IM: CPT | Performed by: FAMILY MEDICINE

## 2018-10-31 PROCEDURE — 58300 INSERT INTRAUTERINE DEVICE: CPT | Performed by: FAMILY MEDICINE

## 2018-10-31 PROCEDURE — 58301 REMOVE INTRAUTERINE DEVICE: CPT | Performed by: FAMILY MEDICINE

## 2018-10-31 PROCEDURE — 99213 OFFICE O/P EST LOW 20 MIN: CPT | Mod: 25 | Performed by: FAMILY MEDICINE

## 2018-10-31 PROCEDURE — 90471 IMMUNIZATION ADMIN: CPT | Performed by: FAMILY MEDICINE

## 2018-10-31 NOTE — PROGRESS NOTES
Injectable Influenza Immunization Documentation    1.  Is the person to be vaccinated sick today?   No    2. Does the person to be vaccinated have an allergy to a component   of the vaccine?   No  Egg Allergy Algorithm Link    3. Has the person to be vaccinated ever had a serious reaction   to influenza vaccine in the past?   No    4. Has the person to be vaccinated ever had Guillain-Barré syndrome?   No    Form completed by Micaela House CMA on 10/31/2018 at 2:51 PM          CALLED CT. HAS THE PT.  WILL CALL BACK

## 2018-10-31 NOTE — MR AVS SNAPSHOT
"              After Visit Summary   10/31/2018    Chad Smith    MRN: [id number]           Patient Information     Date Of Birth          1970        Visit Information        Provider Department      10/31/2018 1:30 PM Shazia Mckenna MD [facility]        Today's Diagnoses     Encounter for IUD removal and reinsertion    -  1       Follow-ups after your visit        Who to contact     If you have questions or need follow up information about today's clinic visit or your schedule please contact [facility] directly at [phone].  Normal or non-critical lab and imaging results will be communicated to you by [employer], letter or phone within 4 business days after the clinic has received the results. If you do not hear from us within 7 days, please contact the clinic through [employer] or phone. If you have a critical or abnormal lab result, we will notify you by phone as soon as possible.  Submit refill requests through [employer] or call your pharmacy and they will forward the refill request to us. Please allow 3 business days for your refill to be completed.          Additional Information About Your Visit        MyChart Information     [employer] gives you secure access to your electronic health record. If you see a primary care provider, you can also send messages to your care team and make appointments. If you have questions, please call your primary care clinic.  If you do not have a primary care provider, please call [phone] and they will assist you.        Care EveryWhere ID     This is your Care EveryWhere ID. This could be used by other organizations to access your [place] medical records  [id number]        Your Vitals Were     Pulse Temperature Height Pulse Oximetry Breastfeeding? BMI (Body Mass Index)    67 98.6  F (37  C) (Oral) 5' 1\" (1.549 m) 95% No 26.3 kg/m2       Blood Pressure from Last 3 Encounters:   10/31/18 118/78   04/11/17 112/80   03/27/17 " 113/78    Weight from Last 3 Encounters:   10/31/18 139 lb 3.2 oz (63.1 kg)   04/11/17 134 lb (60.8 kg)   03/27/17 134 lb (60.8 kg)              We Performed the Following     INSERTION INTRAUTERINE DEVICE     REMOVE INTRAUTERINE DEVICE          Today's Medication Changes          These changes are accurate as of 10/31/18  2:35 PM.  If you have any questions, ask your nurse or doctor.               Start taking these medicines.        Dose/Directions    levonorgestrel 20 MCG/24HR IUD   Commonly known as:  MIRENA   Used for:  Encounter for IUD removal and reinsertion   Started by:  Shazia Mckenna MD        Dose:  1 each   1 each (20 mcg) by Intrauterine route once for 1 dose   Quantity:  1 each   Refills:  0            Where to get your medicines      Some of these will need a paper prescription and others can be bought over the counter.  Ask your nurse if you have questions.     You don't need a prescription for these medications     levonorgestrel 20 MCG/24HR IUD                Primary Care Provider Office Phone # Fax #    Shazia Mckenna [phone] [id number] [facility] [id number]        Equal Access to Services     [facility] AH: Hadii serenity lr hadasho Sojt, waaxda luqadaha, qaybta kaalmada adeegyada, tanisha ortiz . So [facility] [phone].    ATENCIÓN: Si habla español, tiene a de la rosa disposición servicios gratuitos de asistencia lingüística. Llame al [phone].    We comply with applicable federal civil rights laws and [place] laws. We do not discriminate on the basis of race, color, national origin, age, disability, sex, sexual orientation, or gender identity.            Thank you!     Thank you for choosing [facility]  for your care. Our goal is always to provide you with excellent care. Hearing back from our patients is one way we can continue to improve our services. Please take a few minutes to complete the written survey that you  may receive in the mail after your visit with us. Thank you!             Your Updated Medication List - Protect others around you: Learn how to safely use, store and throw away your medicines at www.disposemymeds.org.          This list is accurate as of 10/31/18  2:35 PM.  Always use your most recent med list.                   Brand Name Dispense Instructions for use Diagnosis    levonorgestrel 20 MCG/24HR IUD    MIRENA    1 each    1 each (20 mcg) by Intrauterine route once for 1 dose    Encounter for IUD removal and reinsertion

## 2018-10-31 NOTE — PROGRESS NOTES
"  SUBJECTIVE:   [place] Shailesh Smith is a 47 year old female who presents to clinic today for the following health issues:      Pt is here for IUD replacement.   She had this 5 years ago and it was her second one.   She would like another today.   Discussed the copper IUD vs the mirena.   She would like to continue to go with minimal periods.       Past Medical History:   Diagnosis Date     IUD (intrauterine device) in place        Past Surgical History:   Procedure Laterality Date     C  DELIVERY ONLY  ,2008    x2       MEDICATIONS:  No current outpatient prescriptions on file.     No current facility-administered medications for this visit.        SOCIAL HISTORY:  Social History   Substance Use Topics     Smoking status: Never Smoker     Smokeless tobacco: Never Used     Alcohol use No       Family History   Problem Relation Age of Onset     Hypertension Father      Genitourinary Problems Father      kidney disease     Cerebrovascular Disease Father      Respiratory Father      Sleep apnea     Diabetes Maternal Grandfather      type 2       Objective:  Blood pressure 118/78, pulse 67, temperature 98.6  F (37  C), temperature source Oral, height 5' 1\" (1.549 m), weight 139 lb 3.2 oz (63.1 kg), SpO2 95 %, not currently breastfeeding.      Pelvic: reveals normal sized uterus which is antiverted - there is no tenderness and no adnexal masses felt  The cervix is nulliiparous  Sterile technique used and vagina and cervix cleansed with betadine  After the tenaculum was placed the uterine sound was inserted to 6 cm with no complications - a Mirena IUD was placed and the string cut at 3cm  The patient tolerated the procedure well.  A dilator was used with no problems and minimal pressure needed  Some trouble initially with IUD applicator slipping away from external os repeatedly    Assessment:  IUD removal and replacement    Plan:   The patient was sent home with information on the Mirena and told to " check for the string after each menses  If she experiences symptoms of a vaginal infection, she should be seen  The Mirena needs to be replaced after 5 years  Flu shot today

## 2019-01-03 ENCOUNTER — OFFICE VISIT (OUTPATIENT)
Dept: FAMILY MEDICINE | Facility: CLINIC | Age: 49
End: 2019-01-03
Payer: COMMERCIAL

## 2019-01-03 VITALS
SYSTOLIC BLOOD PRESSURE: 120 MMHG | HEIGHT: 61 IN | OXYGEN SATURATION: 97 % | WEIGHT: 143 LBS | TEMPERATURE: 97.9 F | RESPIRATION RATE: 14 BRPM | DIASTOLIC BLOOD PRESSURE: 68 MMHG | HEART RATE: 72 BPM | BODY MASS INDEX: 27 KG/M2

## 2019-01-03 DIAGNOSIS — L98.9 LESION OF SKIN OF FOOT: ICD-10-CM

## 2019-01-03 DIAGNOSIS — J40 BRONCHITIS: Primary | ICD-10-CM

## 2019-01-03 PROCEDURE — 99213 OFFICE O/P EST LOW 20 MIN: CPT | Performed by: NURSE PRACTITIONER

## 2019-01-03 RX ORDER — BENZONATATE 200 MG/1
200 CAPSULE ORAL 3 TIMES DAILY PRN
Qty: 30 CAPSULE | Refills: 0 | Status: SHIPPED | OUTPATIENT
Start: 2019-01-03 | End: 2019-01-10

## 2019-01-03 RX ORDER — PREDNISONE 20 MG/1
20 TABLET ORAL DAILY
Qty: 5 TABLET | Refills: 0 | Status: SHIPPED | OUTPATIENT
Start: 2019-01-03 | End: 2019-01-08

## 2019-01-03 ASSESSMENT — MIFFLIN-ST. JEOR: SCORE: 1226.02

## 2019-01-03 NOTE — PROGRESS NOTES
SUBJECTIVE:   Chad Smith is a 48 year old female who presents to clinic today for the following health issues:    RESPIRATORY SYMPTOMS      Duration: 1.5 weeks    Description  cough, headache and myalgias    Severity: mild    Accompanying signs and symptoms: None    History (predisposing factors):  none    Precipitating or alleviating factors: None    Therapies tried and outcome:  oral decongestant  Itchy throat.  Increased cough at night.  Cough is productive of sputum that is yellow in color. Denies chest pain, shortness of breath, fever, nasal congestion.      Wart:  Present on side of right foot for a year, has tried OTC compound W without relief.  Is an avid runner, increased pain when done running.     Problem list and histories reviewed & adjusted, as indicated.  Additional history: as documented    Patient Active Problem List   Diagnosis     CARDIOVASCULAR SCREENING; LDL GOAL LESS THAN 160     IUD (intrauterine device) in place     Past Surgical History:   Procedure Laterality Date     C  DELIVERY ONLY  ,2008    x2       Social History     Tobacco Use     Smoking status: Never Smoker     Smokeless tobacco: Never Used   Substance Use Topics     Alcohol use: No     Family History   Problem Relation Age of Onset     Hypertension Father      Genitourinary Problems Father         kidney disease     Cerebrovascular Disease Father      Respiratory Father         Sleep apnea     Diabetes Maternal Grandfather         type 2         Current Outpatient Medications   Medication Sig Dispense Refill     benzonatate (TESSALON) 200 MG capsule Take 1 capsule (200 mg) by mouth 3 times daily as needed for cough 30 capsule 0     predniSONE (DELTASONE) 20 MG tablet Take 20 mg by mouth daily for 5 days. 5 tablet 0     levonorgestrel (MIRENA) 20 MCG/24HR IUD 1 each (20 mcg) by Intrauterine route once for 1 dose 1 each 0     Allergies   Allergen Reactions     Ciprofloxacin        Reviewed and updated as  "needed this visit by clinical staff       Reviewed and updated as needed this visit by Provider         ROS:  CONSTITUTIONAL: NEGATIVE for fever, chills, change in weight  INTEGUMENTARY/SKIN: see HPI  ENT/MOUTH: NEGATIVE for ear, mouth  Problems.See HPI  RESP: see HPI  CV: NEGATIVE for chest pain, palpitations or peripheral edema  PSYCHIATRIC: NEGATIVE for changes in mood or affect    OBJECTIVE:     /68 (BP Location: Right arm, Patient Position: Chair, Cuff Size: Adult Regular)   Pulse 72   Temp 97.9  F (36.6  C) (Oral)   Resp 14   Ht 1.549 m (5' 1\")   Wt 64.9 kg (143 lb)   SpO2 97%   BMI 27.02 kg/m    Body mass index is 27.02 kg/m .   GENERAL: healthy, alert and no distress  HENT: ear canals and TM's normal, nose and mouth without ulcers or lesions  NECK: no adenopathy, no asymmetry, masses, or scars and thyroid normal to palpation  RESP: lungs clear to auscultation - no rales, rhonchi or wheezes  CV: regular rate and rhythm, normal S1 S2, no S3 or S4, no murmur, click or rub, no peripheral edema  SKIN: lateral aspect of right foot with a 0.5 cm raised papule,bleeds easily.  PSYCH: mentation appears normal, affect normal/bright    ASSESSMENT:   See below    PLAN:   Chad was seen today for uri.    Diagnoses and all orders for this visit:    Bronchitis:  Will prescribe prednisone for 5 days, start in am, also may take tessalon tid prn.  Encouraged rest, increase fluid intake.  -     benzonatate (TESSALON) 200 MG capsule; Take 1 capsule (200 mg) by mouth 3 times daily as needed for cough  -     predniSONE (DELTASONE) 20 MG tablet; Take 20 mg by mouth daily for 5 days.    Lesion of skin of foot: unclear if this is a wart, bleeds easily, did apply cryo, if does not resolve would recommend seeing dermatology.  -     DERMATOLOGY REFERRAL      Follow up in 4/2018 for routine wellness exam, sooner as needed.      Susan Haase, APRN [facility]"

## 2019-11-05 ENCOUNTER — HEALTH MAINTENANCE LETTER (OUTPATIENT)
Age: 49
End: 2019-11-05

## 2020-07-02 ASSESSMENT — ENCOUNTER SYMPTOMS
CHILLS: 0
NERVOUS/ANXIOUS: 0
COUGH: 0
NAUSEA: 0
SORE THROAT: 0
CONSTIPATION: 0
HEARTBURN: 0
HEADACHES: 0
PARESTHESIAS: 0
ABDOMINAL PAIN: 0
FEVER: 0
DIZZINESS: 0
BREAST MASS: 0
JOINT SWELLING: 0
ARTHRALGIAS: 0
FREQUENCY: 0
DIARRHEA: 0
HEMATOCHEZIA: 0
MYALGIAS: 0
EYE PAIN: 0
WEAKNESS: 0
HEMATURIA: 0
PALPITATIONS: 0
SHORTNESS OF BREATH: 0
DYSURIA: 0

## 2020-07-06 ENCOUNTER — OFFICE VISIT (OUTPATIENT)
Dept: FAMILY MEDICINE | Facility: CLINIC | Age: 50
End: 2020-07-06
Payer: COMMERCIAL

## 2020-07-06 VITALS
WEIGHT: 152 LBS | SYSTOLIC BLOOD PRESSURE: 124 MMHG | DIASTOLIC BLOOD PRESSURE: 80 MMHG | HEART RATE: 70 BPM | BODY MASS INDEX: 28.72 KG/M2 | OXYGEN SATURATION: 99 % | TEMPERATURE: 98.6 F

## 2020-07-06 DIAGNOSIS — Z13.6 CARDIOVASCULAR SCREENING; LDL GOAL LESS THAN 160: ICD-10-CM

## 2020-07-06 DIAGNOSIS — Z00.00 ROUTINE GENERAL MEDICAL EXAMINATION AT A HEALTH CARE FACILITY: Primary | ICD-10-CM

## 2020-07-06 DIAGNOSIS — Z97.5 IUD (INTRAUTERINE DEVICE) IN PLACE: ICD-10-CM

## 2020-07-06 LAB
BASOPHILS # BLD AUTO: 0.1 10E9/L (ref 0–0.2)
BASOPHILS NFR BLD AUTO: 0.6 %
DIFFERENTIAL METHOD BLD: NORMAL
EOSINOPHIL # BLD AUTO: 0.3 10E9/L (ref 0–0.7)
EOSINOPHIL NFR BLD AUTO: 2.5 %
ERYTHROCYTE [DISTWIDTH] IN BLOOD BY AUTOMATED COUNT: 12.5 % (ref 10–15)
HCT VFR BLD AUTO: 42.1 % (ref 35–47)
HGB BLD-MCNC: 14.3 G/DL (ref 11.7–15.7)
LYMPHOCYTES # BLD AUTO: 2.5 10E9/L (ref 0.8–5.3)
LYMPHOCYTES NFR BLD AUTO: 24.5 %
MCH RBC QN AUTO: 30.4 PG (ref 26.5–33)
MCHC RBC AUTO-ENTMCNC: 34 G/DL (ref 31.5–36.5)
MCV RBC AUTO: 90 FL (ref 78–100)
MONOCYTES # BLD AUTO: 1.1 10E9/L (ref 0–1.3)
MONOCYTES NFR BLD AUTO: 10.7 %
NEUTROPHILS # BLD AUTO: 6.2 10E9/L (ref 1.6–8.3)
NEUTROPHILS NFR BLD AUTO: 61.7 %
PLATELET # BLD AUTO: 306 10E9/L (ref 150–450)
RBC # BLD AUTO: 4.7 10E12/L (ref 3.8–5.2)
WBC # BLD AUTO: 10.1 10E9/L (ref 4–11)

## 2020-07-06 PROCEDURE — G0145 SCR C/V CYTO,THINLAYER,RESCR: HCPCS | Performed by: NURSE PRACTITIONER

## 2020-07-06 PROCEDURE — 80053 COMPREHEN METABOLIC PANEL: CPT | Performed by: NURSE PRACTITIONER

## 2020-07-06 PROCEDURE — 80061 LIPID PANEL: CPT | Performed by: NURSE PRACTITIONER

## 2020-07-06 PROCEDURE — 99396 PREV VISIT EST AGE 40-64: CPT | Performed by: NURSE PRACTITIONER

## 2020-07-06 PROCEDURE — 36415 COLL VENOUS BLD VENIPUNCTURE: CPT | Performed by: NURSE PRACTITIONER

## 2020-07-06 PROCEDURE — 87624 HPV HI-RISK TYP POOLED RSLT: CPT | Performed by: NURSE PRACTITIONER

## 2020-07-06 PROCEDURE — 85025 COMPLETE CBC W/AUTO DIFF WBC: CPT | Performed by: NURSE PRACTITIONER

## 2020-07-06 ASSESSMENT — ENCOUNTER SYMPTOMS
ARTHRALGIAS: 0
SHORTNESS OF BREATH: 0
WEAKNESS: 0
EYE PAIN: 0
HEMATURIA: 0
DIARRHEA: 0
DIZZINESS: 0
CHILLS: 0
BREAST MASS: 0
NERVOUS/ANXIOUS: 0
CONSTIPATION: 0
ABDOMINAL PAIN: 0
MYALGIAS: 0
SORE THROAT: 0
HEMATOCHEZIA: 0
HEADACHES: 0
FREQUENCY: 0
JOINT SWELLING: 0
DYSURIA: 0
PALPITATIONS: 0
HEARTBURN: 0
FEVER: 0
COUGH: 0
NAUSEA: 0
PARESTHESIAS: 0

## 2020-07-06 NOTE — RESULT ENCOUNTER NOTE
Hi [place],  Your CBC (checks for anemia and infection) is normal.  Sincerely,     Susan Haase, CNP

## 2020-07-06 NOTE — PROGRESS NOTES
SUBJECTIVE:   CC: Chad Smith is an 49 year old woman who presents for preventive health visit.     Healthy Habits:     Getting at least 3 servings of Calcium per day:  Yes    Bi-annual eye exam:  Yes    Dental care twice a year:  Yes    Sleep apnea or symptoms of sleep apnea:  None    Diet:  Regular (no restrictions)    Frequency of exercise:  2-3 days/week    Duration of exercise:  45-60 minutes    Taking medications regularly:  Yes    Medication side effects:  None    PHQ-2 Total Score: 0    Additional concerns today:  No  Social:  2 children ages 11 and 16.Works at home  Pap:  Last pap 2017, NIL  Mammogram:  Last in .   Sweat duct gland tumor:  Right foot, followed by dermatology.     Today's PHQ-2 Score:   PHQ-2 (  [employer]) 2020   Q1: Little interest or pleasure in doing things 0   Q2: Feeling down, depressed or hopeless 0   PHQ-2 Score 0   Q1: Little interest or pleasure in doing things Not at all   Q2: Feeling down, depressed or hopeless Not at all   PHQ-2 Score 0       Abuse: Current or Past(Physical, Sexual or Emotional)- No  Do you feel safe in your environment? Yes        Social History     Tobacco Use     Smoking status: Never Smoker     Smokeless tobacco: Never Used   Substance Use Topics     Alcohol use: No       Alcohol Use 2020   Prescreen: >3 drinks/day or >7 drinks/week? No   Prescreen: >3 drinks/day or >7 drinks/week? -       Reviewed orders with patient.  Reviewed health maintenance and updated orders accordingly - Yes  BP Readings from Last 3 Encounters:   20 124/80   19 120/68   10/31/18 118/78    Wt Readings from Last 3 Encounters:   20 68.9 kg (152 lb)   19 64.9 kg (143 lb)   10/31/18 63.1 kg (139 lb 3.2 oz)                  Patient Active Problem List   Diagnosis     CARDIOVASCULAR SCREENING; LDL GOAL LESS THAN 160     IUD (intrauterine device) in place     Past Surgical History:   Procedure Laterality Date     C  DELIVERY ONLY   2004,2008    x2       Social History     Tobacco Use     Smoking status: Never Smoker     Smokeless tobacco: Never Used   Substance Use Topics     Alcohol use: No     Family History   Problem Relation Age of Onset     Hypertension Father      Genitourinary Problems Father         kidney disease     Cerebrovascular Disease Father      Respiratory Father         Sleep apnea     Diabetes Maternal Grandfather         type 2         Current Outpatient Medications   Medication Sig Dispense Refill     levonorgestrel (MIRENA) 20 MCG/24HR IUD 1 each (20 mcg) by Intrauterine route once for 1 dose 1 each 0       Mammogram Screening: Patient under age 50, mutual decision reflected in health maintenance.      Pertinent mammograms are reviewed under the imaging tab.  History of abnormal Pap smear: NO - age 30-65 PAP every 5 years with negative HPV co-testing recommended  PAP / HPV Latest Ref Rng & Units 4/11/2017 5/21/2013 12/8/2008   PAP - NIL NIL NIL   HPV 16 DNA NEG Negative - -   HPV 18 DNA NEG Negative - -   OTHER HR HPV NEG Negative - -     Reviewed and updated as needed this visit by clinical staff         Reviewed and updated as needed this visit by Provider            Review of Systems   Constitutional: Negative for chills and fever.   HENT: Negative for congestion, ear pain, hearing loss and sore throat.    Eyes: Negative for pain and visual disturbance.   Respiratory: Negative for cough and shortness of breath.    Cardiovascular: Negative for chest pain, palpitations and peripheral edema.   Gastrointestinal: Negative for abdominal pain, constipation, diarrhea, heartburn, hematochezia and nausea.   Breasts:  Negative for tenderness, breast mass and discharge.   Genitourinary: Negative for dysuria, frequency, genital sores, hematuria, pelvic pain, urgency, vaginal bleeding and vaginal discharge.   Musculoskeletal: Negative for arthralgias, joint swelling and myalgias.   Skin: Negative for rash.   Neurological: Negative  for dizziness, weakness, headaches and paresthesias.   Psychiatric/Behavioral: Negative for mood changes. The patient is not nervous/anxious.       OBJECTIVE:   /80 (BP Location: Right arm, Patient Position: Chair, Cuff Size: Adult Regular)   Pulse 70   Temp 98.6  F (37  C) (Oral)   Wt 68.9 kg (152 lb)   SpO2 99%   BMI 28.72 kg/m    Physical Exam  GENERAL: healthy, alert and no distress  EYES: Eyes grossly normal to inspection, PERRL and conjunctivae and sclerae normal  HENT: ear canals and TM's normal, nose and mouth without ulcers or lesions  NECK: no adenopathy, no asymmetry, masses, or scars and thyroid normal to palpation  RESP: lungs clear to auscultation - no rales, rhonchi or wheezes  BREAST: normal without masses, tenderness or nipple discharge and no palpable axillary masses or adenopathy  CV: regular rate and rhythm, normal S1 S2, no S3 or S4, no murmur, click or rub, no peripheral edema and peripheral pulses strong  ABDOMEN: soft, nontender, no hepatosplenomegaly, no masses and bowel sounds normal   (female): normal female external genitalia, normal urethral meatus, vaginal mucosa pink, moist, well rugated, and normal cervix/adnexa/uterus without masses or discharge. IUD strings visualized.  MS: no gross musculoskeletal defects noted, no edema  SKIN: no suspicious lesions or rashes  NEURO: Normal strength and tone, mentation intact and speech normal  PSYCH: mentation appears normal, affect normal/bright        ASSESSMENT/PLAN:   Chad was seen today for physical.    Diagnoses and all orders for this visit:    Routine general medical examination at a health care facility  -     CBC with platelets differential  -     Pap imaged thin layer screen with HPV - recommended age 30 - 65 years (select HPV order below)  -     HPV High Risk Types DNA Cervical    IUD (intrauterine device) in place    CARDIOVASCULAR SCREENING; LDL GOAL LESS THAN 160  -     Comprehensive metabolic panel  -     Lipid panel  "reflex to direct LDL Fasting        COUNSELING:  Reviewed preventive health counseling, as reflected in patient instructions       Regular exercise       Healthy diet/nutrition    Estimated body mass index is 27.02 kg/m  as calculated from the following:    Height as of 1/3/19: 1.549 m (5' 1\").    Weight as of 1/3/19: 64.9 kg (143 lb).    Weight management plan: Discussed healthy diet and exercise guidelines     reports that she has never smoked. She has never used smokeless tobacco.      Counseling Resources:  ATP IV Guidelines  Pooled Cohorts Equation Calculator  Breast Cancer Risk Calculator  FRAX Risk Assessment  ICSI Preventive Guidelines  Dietary Guidelines for Americans, 2010  USDA's MyPlate  ASA Prophylaxis  Lung CA Screening  Follow up in 1 year, sooner as needed.   Susan Haase, APRN [facility]"

## 2020-07-07 LAB
ALBUMIN SERPL-MCNC: 3.4 G/DL (ref 3.4–5)
ALP SERPL-CCNC: 60 U/L (ref 40–150)
ALT SERPL W P-5'-P-CCNC: 36 U/L (ref 0–50)
ANION GAP SERPL CALCULATED.3IONS-SCNC: 4 MMOL/L (ref 3–14)
AST SERPL W P-5'-P-CCNC: 26 U/L (ref 0–45)
BILIRUB SERPL-MCNC: 0.3 MG/DL (ref 0.2–1.3)
BUN SERPL-MCNC: 9 MG/DL (ref 7–30)
CALCIUM SERPL-MCNC: 8.3 MG/DL (ref 8.5–10.1)
CHLORIDE SERPL-SCNC: 109 MMOL/L (ref 94–109)
CHOLEST SERPL-MCNC: 180 MG/DL
CO2 SERPL-SCNC: 25 MMOL/L (ref 20–32)
CREAT SERPL-MCNC: 0.64 MG/DL (ref 0.52–1.04)
GFR SERPL CREATININE-BSD FRML MDRD: >90 ML/MIN/{1.73_M2}
GLUCOSE SERPL-MCNC: 91 MG/DL (ref 70–99)
HDLC SERPL-MCNC: 39 MG/DL
LDLC SERPL CALC-MCNC: 111 MG/DL
NONHDLC SERPL-MCNC: 141 MG/DL
POTASSIUM SERPL-SCNC: 4.1 MMOL/L (ref 3.4–5.3)
PROT SERPL-MCNC: 7.6 G/DL (ref 6.8–8.8)
SODIUM SERPL-SCNC: 138 MMOL/L (ref 133–144)
TRIGL SERPL-MCNC: 149 MG/DL

## 2020-07-08 LAB
COPATH REPORT: NORMAL
PAP: NORMAL

## 2020-07-08 NOTE — RESULT ENCOUNTER NOTE
Vega Bonilla,  Your lab results are as below:  1)  Calcium is slightly low at 8.3.  2)  Cholesterol is normal at 180,  your LDL (bad cholesterol) is normal at 111, and your HDL (good cholesterol) is low at 39.  Continue to follow a low cholesterol diet and we will recheck this in 1 year.  3)  Glucose is normal at 91 (normal fasting is <100).      If you have any questions do not hesitate to call the clinic to discuss the results with me further.     Sincerely,    Susan Haase, CNP

## 2020-07-13 LAB
FINAL DIAGNOSIS: NORMAL
HPV HR 12 DNA CVX QL NAA+PROBE: NEGATIVE
HPV16 DNA SPEC QL NAA+PROBE: NEGATIVE
HPV18 DNA SPEC QL NAA+PROBE: NEGATIVE
SPECIMEN DESCRIPTION: NORMAL
SPECIMEN SOURCE CVX/VAG CYTO: NORMAL

## 2020-07-16 ENCOUNTER — ANCILLARY PROCEDURE (OUTPATIENT)
Dept: MAMMOGRAPHY | Facility: CLINIC | Age: 50
End: 2020-07-16
Attending: NURSE PRACTITIONER
Payer: COMMERCIAL

## 2020-07-16 DIAGNOSIS — Z12.31 VISIT FOR SCREENING MAMMOGRAM: ICD-10-CM

## 2020-07-16 PROCEDURE — 77063 BREAST TOMOSYNTHESIS BI: CPT | Mod: TC

## 2020-07-16 PROCEDURE — 77067 SCR MAMMO BI INCL CAD: CPT | Mod: TC

## 2020-11-22 ENCOUNTER — HEALTH MAINTENANCE LETTER (OUTPATIENT)
Age: 50
End: 2020-11-22

## 2021-04-02 ENCOUNTER — IMMUNIZATION (OUTPATIENT)
Dept: NURSING | Facility: CLINIC | Age: 51
End: 2021-04-02
Payer: COMMERCIAL

## 2021-04-02 PROCEDURE — 91300 PR COVID VAC PFIZER DIL RECON 30 MCG/0.3 ML IM: CPT

## 2021-04-02 PROCEDURE — 0001A PR COVID VAC PFIZER DIL RECON 30 MCG/0.3 ML IM: CPT

## 2021-04-23 ENCOUNTER — IMMUNIZATION (OUTPATIENT)
Dept: NURSING | Facility: CLINIC | Age: 51
End: 2021-04-23
Attending: INTERNAL MEDICINE
Payer: COMMERCIAL

## 2021-04-23 PROCEDURE — 0002A PR COVID VAC PFIZER DIL RECON 30 MCG/0.3 ML IM: CPT

## 2021-04-23 PROCEDURE — 91300 PR COVID VAC PFIZER DIL RECON 30 MCG/0.3 ML IM: CPT

## 2021-09-19 ENCOUNTER — HEALTH MAINTENANCE LETTER (OUTPATIENT)
Age: 51
End: 2021-09-19

## 2021-11-10 SDOH — ECONOMIC STABILITY: FOOD INSECURITY: WITHIN THE PAST 12 MONTHS, YOU WORRIED THAT YOUR FOOD WOULD RUN OUT BEFORE YOU GOT MONEY TO BUY MORE.: NEVER TRUE

## 2021-11-10 SDOH — ECONOMIC STABILITY: FOOD INSECURITY: WITHIN THE PAST 12 MONTHS, THE FOOD YOU BOUGHT JUST DIDN'T LAST AND YOU DIDN'T HAVE MONEY TO GET MORE.: NEVER TRUE

## 2021-11-10 SDOH — HEALTH STABILITY: PHYSICAL HEALTH: ON AVERAGE, HOW MANY MINUTES DO YOU ENGAGE IN EXERCISE AT THIS LEVEL?: 60 MIN

## 2021-11-10 SDOH — HEALTH STABILITY: PHYSICAL HEALTH: ON AVERAGE, HOW MANY DAYS PER WEEK DO YOU ENGAGE IN MODERATE TO STRENUOUS EXERCISE (LIKE A BRISK WALK)?: 6 DAYS

## 2021-11-10 SDOH — ECONOMIC STABILITY: TRANSPORTATION INSECURITY
IN THE PAST 12 MONTHS, HAS LACK OF TRANSPORTATION KEPT YOU FROM MEETINGS, WORK, OR FROM GETTING THINGS NEEDED FOR DAILY LIVING?: NO

## 2021-11-10 SDOH — ECONOMIC STABILITY: TRANSPORTATION INSECURITY
IN THE PAST 12 MONTHS, HAS THE LACK OF TRANSPORTATION KEPT YOU FROM MEDICAL APPOINTMENTS OR FROM GETTING MEDICATIONS?: NO

## 2021-11-10 SDOH — ECONOMIC STABILITY: INCOME INSECURITY: HOW HARD IS IT FOR YOU TO PAY FOR THE VERY BASICS LIKE FOOD, HOUSING, MEDICAL CARE, AND HEATING?: NOT HARD AT ALL

## 2021-11-10 SDOH — ECONOMIC STABILITY: INCOME INSECURITY: IN THE LAST 12 MONTHS, WAS THERE A TIME WHEN YOU WERE NOT ABLE TO PAY THE MORTGAGE OR RENT ON TIME?: NO

## 2021-11-10 ASSESSMENT — ENCOUNTER SYMPTOMS
CONSTIPATION: 0
HEMATURIA: 0
DYSURIA: 0
DIZZINESS: 0
ARTHRALGIAS: 0
PALPITATIONS: 0
NAUSEA: 0
DIARRHEA: 0
FEVER: 0
EYE PAIN: 0
MYALGIAS: 0
SHORTNESS OF BREATH: 0
HEMATOCHEZIA: 0
NERVOUS/ANXIOUS: 0
CHILLS: 0
HEADACHES: 0
FREQUENCY: 0
PARESTHESIAS: 0
JOINT SWELLING: 0
WEAKNESS: 0
COUGH: 0
ABDOMINAL PAIN: 0
SORE THROAT: 0
HEARTBURN: 0
BREAST MASS: 0

## 2021-11-10 ASSESSMENT — SOCIAL DETERMINANTS OF HEALTH (SDOH)
IN A TYPICAL WEEK, HOW MANY TIMES DO YOU TALK ON THE PHONE WITH FAMILY, FRIENDS, OR NEIGHBORS?: ONCE A WEEK
HOW OFTEN DO YOU ATTEND CHURCH OR RELIGIOUS SERVICES?: NEVER
HOW OFTEN DO YOU GET TOGETHER WITH FRIENDS OR RELATIVES?: ONCE A WEEK
DO YOU BELONG TO ANY CLUBS OR ORGANIZATIONS SUCH AS CHURCH GROUPS UNIONS, FRATERNAL OR ATHLETIC GROUPS, OR SCHOOL GROUPS?: YES

## 2021-11-10 ASSESSMENT — LIFESTYLE VARIABLES
HOW OFTEN DO YOU HAVE SIX OR MORE DRINKS ON ONE OCCASION: NEVER
HOW OFTEN DO YOU HAVE A DRINK CONTAINING ALCOHOL: MONTHLY OR LESS
HOW MANY STANDARD DRINKS CONTAINING ALCOHOL DO YOU HAVE ON A TYPICAL DAY: 1 OR 2

## 2021-11-11 ENCOUNTER — OFFICE VISIT (OUTPATIENT)
Dept: FAMILY MEDICINE | Facility: CLINIC | Age: 51
End: 2021-11-11
Payer: COMMERCIAL

## 2021-11-11 VITALS
HEIGHT: 61 IN | OXYGEN SATURATION: 97 % | WEIGHT: 138 LBS | SYSTOLIC BLOOD PRESSURE: 132 MMHG | HEART RATE: 57 BPM | TEMPERATURE: 98.1 F | DIASTOLIC BLOOD PRESSURE: 74 MMHG | BODY MASS INDEX: 26.06 KG/M2

## 2021-11-11 DIAGNOSIS — M77.11 LATERAL EPICONDYLITIS OF RIGHT ELBOW: ICD-10-CM

## 2021-11-11 DIAGNOSIS — Z00.00 ROUTINE GENERAL MEDICAL EXAMINATION AT A HEALTH CARE FACILITY: Primary | ICD-10-CM

## 2021-11-11 LAB
BASOPHILS # BLD AUTO: 0 10E3/UL (ref 0–0.2)
BASOPHILS NFR BLD AUTO: 0 %
EOSINOPHIL # BLD AUTO: 0.1 10E3/UL (ref 0–0.7)
EOSINOPHIL NFR BLD AUTO: 2 %
ERYTHROCYTE [DISTWIDTH] IN BLOOD BY AUTOMATED COUNT: 13.2 % (ref 10–15)
HCT VFR BLD AUTO: 43.8 % (ref 35–47)
HGB BLD-MCNC: 14.2 G/DL (ref 11.7–15.7)
LYMPHOCYTES # BLD AUTO: 2 10E3/UL (ref 0.8–5.3)
LYMPHOCYTES NFR BLD AUTO: 27 %
MCH RBC QN AUTO: 29.5 PG (ref 26.5–33)
MCHC RBC AUTO-ENTMCNC: 32.4 G/DL (ref 31.5–36.5)
MCV RBC AUTO: 91 FL (ref 78–100)
MONOCYTES # BLD AUTO: 0.8 10E3/UL (ref 0–1.3)
MONOCYTES NFR BLD AUTO: 11 %
NEUTROPHILS # BLD AUTO: 4.5 10E3/UL (ref 1.6–8.3)
NEUTROPHILS NFR BLD AUTO: 60 %
PLATELET # BLD AUTO: 303 10E3/UL (ref 150–450)
RBC # BLD AUTO: 4.81 10E6/UL (ref 3.8–5.2)
WBC # BLD AUTO: 7.5 10E3/UL (ref 4–11)

## 2021-11-11 PROCEDURE — 80061 LIPID PANEL: CPT | Performed by: NURSE PRACTITIONER

## 2021-11-11 PROCEDURE — 91300 COVID-19,PF,PFIZER (12+ YRS): CPT | Performed by: NURSE PRACTITIONER

## 2021-11-11 PROCEDURE — 99396 PREV VISIT EST AGE 40-64: CPT | Mod: 25 | Performed by: NURSE PRACTITIONER

## 2021-11-11 PROCEDURE — 85025 COMPLETE CBC W/AUTO DIFF WBC: CPT | Performed by: NURSE PRACTITIONER

## 2021-11-11 PROCEDURE — 0004A COVID-19,PF,PFIZER (12+ YRS): CPT | Performed by: NURSE PRACTITIONER

## 2021-11-11 PROCEDURE — 80053 COMPREHEN METABOLIC PANEL: CPT | Performed by: NURSE PRACTITIONER

## 2021-11-11 PROCEDURE — 36415 COLL VENOUS BLD VENIPUNCTURE: CPT | Performed by: NURSE PRACTITIONER

## 2021-11-11 ASSESSMENT — ENCOUNTER SYMPTOMS
EYE PAIN: 0
FREQUENCY: 0
DIARRHEA: 0
JOINT SWELLING: 0
SORE THROAT: 0
HEARTBURN: 0
DIZZINESS: 0
PARESTHESIAS: 0
PALPITATIONS: 0
NAUSEA: 0
DYSURIA: 0
SHORTNESS OF BREATH: 0
BREAST MASS: 0
COUGH: 0
WEAKNESS: 0
FEVER: 0
CONSTIPATION: 0
MYALGIAS: 0
ARTHRALGIAS: 0
HEADACHES: 0
ABDOMINAL PAIN: 0
HEMATOCHEZIA: 0
NERVOUS/ANXIOUS: 0
HEMATURIA: 0
CHILLS: 0

## 2021-11-11 ASSESSMENT — MIFFLIN-ST. JEOR: SCORE: 1193.34

## 2021-11-11 NOTE — PROGRESS NOTES
SUBJECTIVE:   CC: Chad Smith is an 50 year old woman who presents for preventive health visit.       Patient has been advised of split billing requirements and indicates understanding: Yes  Healthy Habits:     Getting at least 3 servings of Calcium per day:  Yes    Bi-annual eye exam:  Yes    Dental care twice a year:  Yes    Sleep apnea or symptoms of sleep apnea:  None    Diet:  Regular (no restrictions)    Frequency of exercise:  6-7 days/week    Duration of exercise:  45-60 minutes    Taking medications regularly:  Yes    Medication side effects:  None    PHQ-2 Total Score: 0    Additional concerns today:  Yes    1.  Cervical cancer screening:  Last pap 7/6/2020  2.  Breast cancer screening:  Last Mammogram 7/2020  3.   Menses:  Mirena IUD placed 10/2018  4.  Right arm pain:  Present for 3 months. Works on computer and boxing.  Turning and lifting objects increases the pain.  Has jolts of tingling down the arm. Has tried deep massage. Takes tylenol if pain is really bad.      Today's PHQ-2 Score:   PHQ-2 ( 1999 Pfizer) 11/10/2021   Q1: Little interest or pleasure in doing things 0   Q2: Feeling down, depressed or hopeless 0   PHQ-2 Score 0   Q1: Little interest or pleasure in doing things Not at all   Q2: Feeling down, depressed or hopeless Not at all   PHQ-2 Score 0       Abuse: Current or Past (Physical, Sexual or Emotional) - No  Do you feel safe in your environment? Yes    Have you ever done Advance Care Planning? (For example, a Health Directive, POLST, or a discussion with a medical provider or your loved ones about your wishes): No, advance care planning information given to patient to review.  Patient declined advance care planning discussion at this time.    Social History     Tobacco Use     Smoking status: Never Smoker     Smokeless tobacco: Never Used   Substance Use Topics     Alcohol use: No     If you drink alcohol do you typically have >3 drinks per day or >7 drinks per week?  No    Alcohol Use 11/10/2021   Prescreen: >3 drinks/day or >7 drinks/week? Not Applicable   Prescreen: >3 drinks/day or >7 drinks/week? -       Reviewed orders with patient.  Reviewed health maintenance and updated orders accordingly - Yes  Lab work is in process  BP Readings from Last 3 Encounters:   21 132/74   20 124/80   19 120/68    Wt Readings from Last 3 Encounters:   21 62.6 kg (138 lb)   20 68.9 kg (152 lb)   19 64.9 kg (143 lb)                  Patient Active Problem List   Diagnosis     CARDIOVASCULAR SCREENING; LDL GOAL LESS THAN 160     IUD (intrauterine device) in place     Past Surgical History:   Procedure Laterality Date     C  DELIVERY ONLY  ,2008    x2       Social History     Tobacco Use     Smoking status: Never Smoker     Smokeless tobacco: Never Used   Substance Use Topics     Alcohol use: No     Family History   Problem Relation Age of Onset     Hypertension Father      Genitourinary Problems Father         kidney disease     Cerebrovascular Disease Father      Respiratory Father         Sleep apnea     Diabetes Maternal Grandfather         type 2         Current Outpatient Medications   Medication Sig Dispense Refill     levonorgestrel (MIRENA) 20 MCG/24HR IUD 1 each (20 mcg) by Intrauterine route once for 1 dose 1 each 0       Breast Cancer Screening:    Breast CA Risk Assessment (FHS-7) 11/10/2021   Do you have a family history of breast, colon, or ovarian cancer? No / Unknown       click delete button to remove this line now  Mammogram Screening: Recommended annual mammography  Pertinent mammograms are reviewed under the imaging tab.    History of abnormal Pap smear: NO - age 30- 65 PAP every 3 years recommended  PAP / HPV Latest Ref Rng & Units 2020   PAP (Historical) - NIL NIL NIL   HPV16 NEG:Negative Negative Negative -   HPV18 NEG:Negative Negative Negative -   HRHPV NEG:Negative Negative Negative -     Reviewed  "and updated as needed this visit by clinical staff  Tobacco  Allergies    Med Hx  Surg Hx  Fam Hx  Soc Hx       Reviewed and updated as needed this visit by Provider                   Review of Systems   Constitutional: Negative for chills and fever.   HENT: Negative for congestion, ear pain, hearing loss and sore throat.    Eyes: Negative for pain and visual disturbance.   Respiratory: Negative for cough and shortness of breath.    Cardiovascular: Negative for chest pain, palpitations and peripheral edema.   Gastrointestinal: Negative for abdominal pain, constipation, diarrhea, heartburn, hematochezia and nausea.   Breasts:  Negative for tenderness, breast mass and discharge.   Genitourinary: Negative for dysuria, frequency, genital sores, hematuria, pelvic pain, urgency, vaginal bleeding and vaginal discharge.   Musculoskeletal: Negative for arthralgias, joint swelling and myalgias.   Skin: Negative for rash.   Neurological: Negative for dizziness, weakness, headaches and paresthesias.   Psychiatric/Behavioral: Negative for mood changes. The patient is not nervous/anxious.     Right arm:  See HPI  OBJECTIVE:   /74   Pulse 57   Temp 98.1  F (36.7  C) (Oral)   Ht 1.549 m (5' 1\")   Wt 62.6 kg (138 lb)   SpO2 97%   BMI 26.07 kg/m    Physical Exam  GENERAL: healthy, alert and no distress  EYES: Eyes grossly normal to inspection,   HENT: ear canals and TM's normal, nose and mouth without ulcers or lesions  NECK: no adenopathy, no asymmetry, masses, or scars and thyroid normal to palpation  RESP: lungs clear to auscultation - no rales, rhonchi or wheezes  BREAST: normal without masses, tenderness or nipple discharge and no palpable axillary masses or adenopathy  CV: regular rate and rhythm, normal S1 S2, no S3 or S4, no murmur, click or rub, no peripheral edema  ABDOMEN: soft, nontender, no hepatosplenomegaly, no masses and bowel sounds normal  MS: right lateral epicondyle and median nerve at wrist with " "tenderness upon palpation, full ROM of elbow and wrist present,  no gross musculoskeletal defects noted, no edema  SKIN: no suspicious lesions or rashes  NEURO: BUE with normal strength and tone, mentation intact and speech normal. Tinel and phalen test of RUE negative  PSYCH: mentation appears normal, affect normal/bright    ASSESSMENT/PLAN:   Chad was seen today for physical.    Diagnoses and all orders for this visit:    Routine general medical examination at a health care facility  -     CBC with Platelets & Differential; Future  -     Comprehensive metabolic panel; Future  -     Lipid panel reflex to direct LDL Fasting; Future  -     Lipid panel reflex to direct LDL Fasting  -     Comprehensive metabolic panel  -     CBC with Platelets & Differential    Lateral epicondylitis of right elbow: discussed stopping boxing until pain subsides, ice for 20 min tid, stretching, wearing tennis elbow strap, ibuprofen prn.    -     Miscellaneous Order for DME - ONLY FOR DME  -     Miscellaneous Order for DME - ONLY FOR DME    Other orders  -     REVIEW OF HEALTH MAINTENANCE PROTOCOL ORDERS  -     COVID-19,PF,PFIZER (12+ Yrs PURPLE LABEL)        Patient has been advised of split billing requirements and indicates understanding:   COUNSELING:  Reviewed preventive health counseling, as reflected in patient instructions       Regular exercise       Healthy diet/nutrition       Immunizations    Vaccinated for: Pfizer booster          Estimated body mass index is 26.07 kg/m  as calculated from the following:    Height as of this encounter: 1.549 m (5' 1\").    Weight as of this encounter: 62.6 kg (138 lb).    Weight management plan: Discussed healthy diet and exercise guidelines    She reports that she has never smoked. She has never used smokeless tobacco.      Counseling Resources:  ATP IV Guidelines  Pooled Cohorts Equation Calculator  Breast Cancer Risk Calculator  BRCA-Related Cancer Risk Assessment: FHS-7 Tool  FRAX Risk " Assessment  ICSI Preventive Guidelines  Dietary Guidelines for Americans, 2010  USDA's MyPlate  ASA Prophylaxis  Lung CA Screening  Follow up in 1 year.  Susan Haase, APRN CNP  [facility]

## 2021-11-12 LAB
ALBUMIN SERPL-MCNC: 3.5 G/DL (ref 3.4–5)
ALP SERPL-CCNC: 61 U/L (ref 40–150)
ALT SERPL W P-5'-P-CCNC: 20 U/L (ref 0–50)
ANION GAP SERPL CALCULATED.3IONS-SCNC: 10 MMOL/L (ref 3–14)
AST SERPL W P-5'-P-CCNC: 16 U/L (ref 0–45)
BILIRUB SERPL-MCNC: 0.5 MG/DL (ref 0.2–1.3)
BUN SERPL-MCNC: 10 MG/DL (ref 7–30)
CALCIUM SERPL-MCNC: 8.5 MG/DL (ref 8.5–10.1)
CHLORIDE BLD-SCNC: 106 MMOL/L (ref 94–109)
CHOLEST SERPL-MCNC: 179 MG/DL
CO2 SERPL-SCNC: 24 MMOL/L (ref 20–32)
CREAT SERPL-MCNC: 0.64 MG/DL (ref 0.52–1.04)
FASTING STATUS PATIENT QL REPORTED: YES
GFR SERPL CREATININE-BSD FRML MDRD: >90 ML/MIN/1.73M2
GLUCOSE BLD-MCNC: 95 MG/DL (ref 70–99)
HDLC SERPL-MCNC: 51 MG/DL
LDLC SERPL CALC-MCNC: 115 MG/DL
NONHDLC SERPL-MCNC: 128 MG/DL
POTASSIUM BLD-SCNC: 3.8 MMOL/L (ref 3.4–5.3)
PROT SERPL-MCNC: 7.9 G/DL (ref 6.8–8.8)
SODIUM SERPL-SCNC: 140 MMOL/L (ref 133–144)
TRIGL SERPL-MCNC: 66 MG/DL

## 2021-11-22 ENCOUNTER — ANCILLARY PROCEDURE (OUTPATIENT)
Dept: MAMMOGRAPHY | Facility: CLINIC | Age: 51
End: 2021-11-22
Payer: COMMERCIAL

## 2021-11-22 DIAGNOSIS — Z12.31 OTHER SCREENING MAMMOGRAM: ICD-10-CM

## 2021-11-22 PROCEDURE — 77063 BREAST TOMOSYNTHESIS BI: CPT | Mod: TC | Performed by: RADIOLOGY

## 2021-11-22 PROCEDURE — 77067 SCR MAMMO BI INCL CAD: CPT | Mod: TC | Performed by: RADIOLOGY

## 2022-11-21 ENCOUNTER — HEALTH MAINTENANCE LETTER (OUTPATIENT)
Age: 52
End: 2022-11-21

## 2022-12-24 ENCOUNTER — HEALTH MAINTENANCE LETTER (OUTPATIENT)
Age: 52
End: 2022-12-24

## 2023-04-06 SDOH — ECONOMIC STABILITY: FOOD INSECURITY: WITHIN THE PAST 12 MONTHS, YOU WORRIED THAT YOUR FOOD WOULD RUN OUT BEFORE YOU GOT MONEY TO BUY MORE.: NEVER TRUE

## 2023-04-06 SDOH — ECONOMIC STABILITY: INCOME INSECURITY: HOW HARD IS IT FOR YOU TO PAY FOR THE VERY BASICS LIKE FOOD, HOUSING, MEDICAL CARE, AND HEATING?: NOT HARD AT ALL

## 2023-04-06 SDOH — HEALTH STABILITY: PHYSICAL HEALTH: ON AVERAGE, HOW MANY MINUTES DO YOU ENGAGE IN EXERCISE AT THIS LEVEL?: 40 MIN

## 2023-04-06 SDOH — ECONOMIC STABILITY: FOOD INSECURITY: WITHIN THE PAST 12 MONTHS, THE FOOD YOU BOUGHT JUST DIDN'T LAST AND YOU DIDN'T HAVE MONEY TO GET MORE.: NEVER TRUE

## 2023-04-06 SDOH — ECONOMIC STABILITY: INCOME INSECURITY: IN THE LAST 12 MONTHS, WAS THERE A TIME WHEN YOU WERE NOT ABLE TO PAY THE MORTGAGE OR RENT ON TIME?: NO

## 2023-04-06 SDOH — HEALTH STABILITY: PHYSICAL HEALTH: ON AVERAGE, HOW MANY DAYS PER WEEK DO YOU ENGAGE IN MODERATE TO STRENUOUS EXERCISE (LIKE A BRISK WALK)?: 3 DAYS

## 2023-04-06 ASSESSMENT — LIFESTYLE VARIABLES
HOW OFTEN DO YOU HAVE A DRINK CONTAINING ALCOHOL: MONTHLY OR LESS
SKIP TO QUESTIONS 9-10: 1
HOW OFTEN DO YOU HAVE SIX OR MORE DRINKS ON ONE OCCASION: NEVER
AUDIT-C TOTAL SCORE: 1
HOW MANY STANDARD DRINKS CONTAINING ALCOHOL DO YOU HAVE ON A TYPICAL DAY: PATIENT DOES NOT DRINK

## 2023-04-06 ASSESSMENT — ENCOUNTER SYMPTOMS
DIARRHEA: 0
PARESTHESIAS: 0
FEVER: 0
PALPITATIONS: 0
NAUSEA: 0
NERVOUS/ANXIOUS: 0
EYE PAIN: 0
SORE THROAT: 0
HEARTBURN: 0
HEMATOCHEZIA: 0
DYSURIA: 0
MYALGIAS: 0
HEMATURIA: 0
ABDOMINAL PAIN: 0
DIZZINESS: 0
BREAST MASS: 0
WEAKNESS: 0
SHORTNESS OF BREATH: 0
FREQUENCY: 0
COUGH: 0
CONSTIPATION: 0
HEADACHES: 0
ARTHRALGIAS: 0
CHILLS: 0
JOINT SWELLING: 0

## 2023-04-06 ASSESSMENT — SOCIAL DETERMINANTS OF HEALTH (SDOH)
HOW OFTEN DO YOU GET TOGETHER WITH FRIENDS OR RELATIVES?: ONCE A WEEK
IN A TYPICAL WEEK, HOW MANY TIMES DO YOU TALK ON THE PHONE WITH FAMILY, FRIENDS, OR NEIGHBORS?: TWICE A WEEK
HOW OFTEN DO YOU ATTEND CHURCH OR RELIGIOUS SERVICES?: 1 TO 4 TIMES PER YEAR
DO YOU BELONG TO ANY CLUBS OR ORGANIZATIONS SUCH AS CHURCH GROUPS UNIONS, FRATERNAL OR ATHLETIC GROUPS, OR SCHOOL GROUPS?: YES

## 2023-04-07 ENCOUNTER — OFFICE VISIT (OUTPATIENT)
Dept: FAMILY MEDICINE | Facility: CLINIC | Age: 53
End: 2023-04-07
Payer: COMMERCIAL

## 2023-04-07 VITALS
BODY MASS INDEX: 27.9 KG/M2 | HEART RATE: 79 BPM | WEIGHT: 147.8 LBS | TEMPERATURE: 97.8 F | SYSTOLIC BLOOD PRESSURE: 134 MMHG | DIASTOLIC BLOOD PRESSURE: 84 MMHG | OXYGEN SATURATION: 96 % | HEIGHT: 61 IN

## 2023-04-07 DIAGNOSIS — Z12.11 SCREEN FOR COLON CANCER: ICD-10-CM

## 2023-04-07 DIAGNOSIS — Z00.00 ROUTINE HISTORY AND PHYSICAL EXAMINATION OF ADULT: Primary | ICD-10-CM

## 2023-04-07 DIAGNOSIS — Z12.31 VISIT FOR SCREENING MAMMOGRAM: ICD-10-CM

## 2023-04-07 DIAGNOSIS — Z11.59 NEED FOR HEPATITIS C SCREENING TEST: ICD-10-CM

## 2023-04-07 LAB
ALBUMIN SERPL BCG-MCNC: 4.3 G/DL (ref 3.5–5.2)
ALP SERPL-CCNC: 66 U/L (ref 35–104)
ALT SERPL W P-5'-P-CCNC: 16 U/L (ref 10–35)
ANION GAP SERPL CALCULATED.3IONS-SCNC: 13 MMOL/L (ref 7–15)
AST SERPL W P-5'-P-CCNC: 20 U/L (ref 10–35)
BILIRUB SERPL-MCNC: 0.5 MG/DL
BUN SERPL-MCNC: 13.1 MG/DL (ref 6–20)
CALCIUM SERPL-MCNC: 9.2 MG/DL (ref 8.6–10)
CHLORIDE SERPL-SCNC: 105 MMOL/L (ref 98–107)
CHOLEST SERPL-MCNC: 183 MG/DL
CREAT SERPL-MCNC: 0.68 MG/DL (ref 0.51–0.95)
DEPRECATED HCO3 PLAS-SCNC: 23 MMOL/L (ref 22–29)
ERYTHROCYTE [DISTWIDTH] IN BLOOD BY AUTOMATED COUNT: 12.5 % (ref 10–15)
GFR SERPL CREATININE-BSD FRML MDRD: >90 ML/MIN/1.73M2
GLUCOSE SERPL-MCNC: 108 MG/DL (ref 70–99)
HCT VFR BLD AUTO: 43.2 % (ref 35–47)
HDLC SERPL-MCNC: 43 MG/DL
HGB BLD-MCNC: 14.6 G/DL (ref 11.7–15.7)
LDLC SERPL CALC-MCNC: 124 MG/DL
MCH RBC QN AUTO: 29.9 PG (ref 26.5–33)
MCHC RBC AUTO-ENTMCNC: 33.8 G/DL (ref 31.5–36.5)
MCV RBC AUTO: 89 FL (ref 78–100)
NONHDLC SERPL-MCNC: 140 MG/DL
PLATELET # BLD AUTO: 305 10E3/UL (ref 150–450)
POTASSIUM SERPL-SCNC: 3.9 MMOL/L (ref 3.4–5.3)
PROT SERPL-MCNC: 7.9 G/DL (ref 6.4–8.3)
RBC # BLD AUTO: 4.88 10E6/UL (ref 3.8–5.2)
SODIUM SERPL-SCNC: 141 MMOL/L (ref 136–145)
TRIGL SERPL-MCNC: 78 MG/DL
WBC # BLD AUTO: 12.5 10E3/UL (ref 4–11)

## 2023-04-07 PROCEDURE — 80061 LIPID PANEL: CPT | Performed by: PHYSICIAN ASSISTANT

## 2023-04-07 PROCEDURE — 90471 IMMUNIZATION ADMIN: CPT | Performed by: PHYSICIAN ASSISTANT

## 2023-04-07 PROCEDURE — 91312 COVID-19 VACCINE BIVALENT BOOSTER 12+ (PFIZER): CPT | Performed by: PHYSICIAN ASSISTANT

## 2023-04-07 PROCEDURE — 36415 COLL VENOUS BLD VENIPUNCTURE: CPT | Performed by: PHYSICIAN ASSISTANT

## 2023-04-07 PROCEDURE — 80053 COMPREHEN METABOLIC PANEL: CPT | Performed by: PHYSICIAN ASSISTANT

## 2023-04-07 PROCEDURE — 99396 PREV VISIT EST AGE 40-64: CPT | Mod: 25 | Performed by: PHYSICIAN ASSISTANT

## 2023-04-07 PROCEDURE — 0124A COVID-19 VACCINE BIVALENT BOOSTER 12+ (PFIZER): CPT | Performed by: PHYSICIAN ASSISTANT

## 2023-04-07 PROCEDURE — 90746 HEPB VACCINE 3 DOSE ADULT IM: CPT | Performed by: PHYSICIAN ASSISTANT

## 2023-04-07 PROCEDURE — 86803 HEPATITIS C AB TEST: CPT | Performed by: PHYSICIAN ASSISTANT

## 2023-04-07 PROCEDURE — 85027 COMPLETE CBC AUTOMATED: CPT | Performed by: PHYSICIAN ASSISTANT

## 2023-04-07 ASSESSMENT — ENCOUNTER SYMPTOMS
WEAKNESS: 0
DYSURIA: 0
ABDOMINAL PAIN: 0
DIZZINESS: 0
HEARTBURN: 0
PARESTHESIAS: 0
DIARRHEA: 0
SHORTNESS OF BREATH: 0
MYALGIAS: 0
JOINT SWELLING: 0
HEADACHES: 0
NERVOUS/ANXIOUS: 0
NAUSEA: 0
SORE THROAT: 0
EYE PAIN: 0
HEMATOCHEZIA: 0
ARTHRALGIAS: 0
FREQUENCY: 0
PALPITATIONS: 0
CONSTIPATION: 0
HEMATURIA: 0
FEVER: 0
CHILLS: 0
BREAST MASS: 0
COUGH: 0

## 2023-04-07 NOTE — PROGRESS NOTES
SUBJECTIVE:   CC: Chad is an 52 year old who presents for preventive health visit.       2023     7:49 AM   Additional Questions   Roomed by Sandy Veloz   Patient has been advised of split billing requirements and indicates understanding: Yes  Healthy Habits:     Getting at least 3 servings of Calcium per day:  Yes    Bi-annual eye exam:  Yes    Dental care twice a year:  Yes    Sleep apnea or symptoms of sleep apnea:  None    Diet:  Regular (no restrictions)    Frequency of exercise:  2-3 days/week    Duration of exercise:  30-45 minutes    Taking medications regularly:  Yes    Medication side effects:  None    PHQ-2 Total Score: 0    Additional concerns today:  Yes              Today's PHQ-2 Score:       2023     7:56 PM   PHQ-2 (  [employer])   Q1: Little interest or pleasure in doing things 0   Q2: Feeling down, depressed or hopeless 0   PHQ-2 Score 0   Q1: Little interest or pleasure in doing things Not at all   Q2: Feeling down, depressed or hopeless Not at all   PHQ-2 Score 0           Social History     Tobacco Use     Smoking status: Never     Smokeless tobacco: Never   Vaping Use     Vaping status: Never Used   Substance Use Topics     Alcohol use: No             2023     7:55 PM   Alcohol Use   Prescreen: >3 drinks/day or >7 drinks/week? Not Applicable     Reviewed orders with patient.  Reviewed health maintenance and updated orders accordingly - Yes  Lab work is in process    Breast Cancer Screenin/10/2021     9:00 PM 2021     9:23 AM   Breast CA Risk Assessment (FHS-7)   Do you have a family history of breast, colon, or ovarian cancer? No / Unknown No / Unknown         Mammogram Screening: Recommended annual mammography  Pertinent mammograms are reviewed under the imaging tab.    History of abnormal Pap smear: NO - age 30-65 PAP every 5 years with negative HPV co-testing recommended      Latest Ref Rng & Units 2020    11:21 AM 2020    11:15 AM 2017      "1:30 PM   PAP / HPV   PAP (Historical)  NIL       HPV 16 DNA NEG^Negative  Negative   Negative     HPV 18 DNA NEG^Negative  Negative   Negative     Other HR HPV NEG^Negative  Negative   Negative       Reviewed and updated as needed this visit by clinical staff   Tobacco  Allergies  Meds              Reviewed and updated as needed this visit by Provider                 Past Medical History:   Diagnosis Date     IUD (intrauterine device) in place 10/31/2018    needs removal 10/31/2023        Review of Systems   Constitutional: Negative for chills and fever.   HENT: Negative for congestion, ear pain, hearing loss and sore throat.    Eyes: Negative for pain and visual disturbance.   Respiratory: Negative for cough and shortness of breath.    Cardiovascular: Negative for chest pain, palpitations and peripheral edema.   Gastrointestinal: Negative for abdominal pain, constipation, diarrhea, heartburn, hematochezia and nausea.   Breasts:  Negative for tenderness, breast mass and discharge.   Genitourinary: Negative for dysuria, frequency, genital sores, hematuria, pelvic pain, urgency, vaginal bleeding and vaginal discharge.   Musculoskeletal: Negative for arthralgias, joint swelling and myalgias.   Skin: Negative for rash.   Neurological: Negative for dizziness, weakness, headaches and paresthesias.   Psychiatric/Behavioral: Negative for mood changes. The patient is not nervous/anxious.           OBJECTIVE:   /84 (BP Location: Right arm, Patient Position: Sitting, Cuff Size: Adult Regular)   Pulse 79   Temp 97.8  F (36.6  C) (Oral)   Ht 1.549 m (5' 1\")   Wt 67 kg (147 lb 12.8 oz)   SpO2 96%   BMI 27.93 kg/m    Physical Exam  GENERAL: healthy, alert and no distress  EYES: Eyes grossly normal to inspection, PERRL and conjunctivae and sclerae normal  HENT: ear canals and TM's normal, nose and mouth without ulcers or lesions  NECK: no adenopathy, no asymmetry, masses, or scars and thyroid normal to " palpation  RESP: lungs clear to auscultation - no rales, rhonchi or wheezes  CV: regular rate and rhythm, normal S1 S2, no S3 or S4, no murmur, click or rub, no peripheral edema and peripheral pulses strong  ABDOMEN: soft, nontender, no hepatosplenomegaly, no masses and bowel sounds normal  MS: no gross musculoskeletal defects noted, no edema  SKIN: no suspicious lesions or rashes  NEURO: Normal strength and tone, mentation intact and speech normal  PSYCH: mentation appears normal, affect normal/bright        ASSESSMENT/PLAN:   (Z00.00) Routine history and physical examination of adult  (primary encounter diagnosis)  Comment: await labs.   Plan: CBC with platelets, Comprehensive metabolic         panel (BMP + Alb, Alk Phos, ALT, AST, Total.         Bili, TP), Lipid panel reflex to direct LDL         Fasting            (Z12.11) Screen for colon cancer  Comment: agreeable to colonscopy  Plan: Colonoscopy Screening  Referral            (Z11.59) Need for hepatitis C screening test  Comment: agreeable  Plan: Hepatitis C Screen Reflex to HCV RNA Quant and         Genotype            (Z12.31) Visit for screening mammogram  Comment:   Plan: MA SCREENING DIGITAL BILAT - Future  (s+30)              Patient has been advised of split billing requirements and indicates understanding: Yes      COUNSELING:  Reviewed preventive health counseling, as reflected in patient instructions       Regular exercise       Healthy diet/nutrition       Vision screening       Immunizations    Vaccinated for: Covid-19 and Hepatitis B             Colorectal Cancer Screening        She reports that she has never smoked. She has never used smokeless tobacco.          VIVIANE Aguilar [facility]

## 2023-04-08 LAB — HCV AB SERPL QL IA: NONREACTIVE

## 2023-04-16 ENCOUNTER — HEALTH MAINTENANCE LETTER (OUTPATIENT)
Age: 53
End: 2023-04-16

## 2024-02-03 ENCOUNTER — HEALTH MAINTENANCE LETTER (OUTPATIENT)
Age: 54
End: 2024-02-03

## 2024-03-08 ENCOUNTER — PATIENT OUTREACH (OUTPATIENT)
Dept: CARE COORDINATION | Facility: CLINIC | Age: 54
End: 2024-03-08
Payer: COMMERCIAL

## 2024-03-22 ENCOUNTER — PATIENT OUTREACH (OUTPATIENT)
Dept: CARE COORDINATION | Facility: CLINIC | Age: 54
End: 2024-03-22
Payer: COMMERCIAL

## 2024-06-22 ENCOUNTER — HEALTH MAINTENANCE LETTER (OUTPATIENT)
Age: 54
End: 2024-06-22

## 2025-07-12 ENCOUNTER — HEALTH MAINTENANCE LETTER (OUTPATIENT)
Age: 55
End: 2025-07-12